# Patient Record
Sex: MALE | Race: WHITE | Employment: FULL TIME | URBAN - METROPOLITAN AREA
[De-identification: names, ages, dates, MRNs, and addresses within clinical notes are randomized per-mention and may not be internally consistent; named-entity substitution may affect disease eponyms.]

---

## 2017-11-07 PROBLEM — M16.11 PRIMARY OSTEOARTHRITIS OF RIGHT HIP: Status: ACTIVE | Noted: 2017-11-07

## 2018-04-12 ENCOUNTER — HOSPITAL ENCOUNTER (OUTPATIENT)
Dept: PREADMISSION TESTING | Age: 58
Discharge: HOME | End: 2018-04-16
Payer: COMMERCIAL

## 2018-04-12 RX ORDER — OMEPRAZOLE 20 MG/1
20 CAPSULE, DELAYED RELEASE ORAL DAILY
COMMUNITY

## 2018-04-12 RX ORDER — CETIRIZINE HYDROCHLORIDE 5 MG/1
5 TABLET ORAL PRN
COMMUNITY

## 2018-04-12 RX ORDER — LIDOCAINE HYDROCHLORIDE 10 MG/ML
1 INJECTION, SOLUTION EPIDURAL; INFILTRATION; INTRACAUDAL; PERINEURAL
Status: CANCELLED | OUTPATIENT
Start: 2018-04-12 | End: 2018-04-12

## 2018-04-12 RX ORDER — SODIUM CHLORIDE, SODIUM LACTATE, POTASSIUM CHLORIDE, CALCIUM CHLORIDE 600; 310; 30; 20 MG/100ML; MG/100ML; MG/100ML; MG/100ML
INJECTION, SOLUTION INTRAVENOUS CONTINUOUS
Status: CANCELLED | OUTPATIENT
Start: 2018-04-12

## 2018-04-12 RX ORDER — SODIUM CHLORIDE 0.9 % (FLUSH) 0.9 %
10 SYRINGE (ML) INJECTION PRN
Status: CANCELLED | OUTPATIENT
Start: 2018-04-12

## 2018-04-12 RX ORDER — SODIUM CHLORIDE 0.9 % (FLUSH) 0.9 %
10 SYRINGE (ML) INJECTION EVERY 12 HOURS SCHEDULED
Status: CANCELLED | OUTPATIENT
Start: 2018-04-12

## 2018-04-19 ENCOUNTER — ANESTHESIA EVENT (OUTPATIENT)
Dept: OPERATING ROOM | Age: 58
DRG: 470 | End: 2018-04-19
Payer: COMMERCIAL

## 2018-04-20 ENCOUNTER — ANESTHESIA (OUTPATIENT)
Dept: OPERATING ROOM | Age: 58
DRG: 470 | End: 2018-04-20
Payer: COMMERCIAL

## 2018-04-20 ENCOUNTER — HOSPITAL ENCOUNTER (INPATIENT)
Age: 58
LOS: 1 days | Discharge: HOME HEALTH | DRG: 470 | End: 2018-04-21
Attending: ORTHOPAEDIC SURGERY | Admitting: ORTHOPAEDIC SURGERY
Payer: COMMERCIAL

## 2018-04-20 ENCOUNTER — APPOINTMENT (OUTPATIENT)
Dept: GENERAL RADIOLOGY | Age: 58
DRG: 470 | End: 2018-04-20
Attending: ORTHOPAEDIC SURGERY
Payer: COMMERCIAL

## 2018-04-20 VITALS — TEMPERATURE: 96.3 F | DIASTOLIC BLOOD PRESSURE: 57 MMHG | SYSTOLIC BLOOD PRESSURE: 104 MMHG | OXYGEN SATURATION: 100 %

## 2018-04-20 DIAGNOSIS — M16.11 PRIMARY OSTEOARTHRITIS OF RIGHT HIP: ICD-10-CM

## 2018-04-20 DIAGNOSIS — Z96.641 STATUS POST TOTAL HIP REPLACEMENT, RIGHT: Primary | ICD-10-CM

## 2018-04-20 DIAGNOSIS — M47.817 LUMBOSACRAL SPONDYLOSIS WITHOUT MYELOPATHY: ICD-10-CM

## 2018-04-20 PROCEDURE — 97166 OT EVAL MOD COMPLEX 45 MIN: CPT

## 2018-04-20 PROCEDURE — 6370000000 HC RX 637 (ALT 250 FOR IP): Performed by: ORTHOPAEDIC SURGERY

## 2018-04-20 PROCEDURE — 7100000001 HC PACU RECOVERY - ADDTL 15 MIN: Performed by: ORTHOPAEDIC SURGERY

## 2018-04-20 PROCEDURE — 2500000003 HC RX 250 WO HCPCS: Performed by: NURSE ANESTHETIST, CERTIFIED REGISTERED

## 2018-04-20 PROCEDURE — 0SR90JA REPLACEMENT OF RIGHT HIP JOINT WITH SYNTHETIC SUBSTITUTE, UNCEMENTED, OPEN APPROACH: ICD-10-PCS | Performed by: ORTHOPAEDIC SURGERY

## 2018-04-20 PROCEDURE — G8979 MOBILITY GOAL STATUS: HCPCS

## 2018-04-20 PROCEDURE — 2720000010 HC SURG SUPPLY STERILE: Performed by: ORTHOPAEDIC SURGERY

## 2018-04-20 PROCEDURE — 97162 PT EVAL MOD COMPLEX 30 MIN: CPT

## 2018-04-20 PROCEDURE — G8978 MOBILITY CURRENT STATUS: HCPCS

## 2018-04-20 PROCEDURE — 6360000002 HC RX W HCPCS: Performed by: NURSE ANESTHETIST, CERTIFIED REGISTERED

## 2018-04-20 PROCEDURE — 6360000002 HC RX W HCPCS: Performed by: ORTHOPAEDIC SURGERY

## 2018-04-20 PROCEDURE — 2580000003 HC RX 258

## 2018-04-20 PROCEDURE — 97535 SELF CARE MNGMENT TRAINING: CPT

## 2018-04-20 PROCEDURE — 3600000004 HC SURGERY LEVEL 4 BASE: Performed by: ORTHOPAEDIC SURGERY

## 2018-04-20 PROCEDURE — 7100000000 HC PACU RECOVERY - FIRST 15 MIN: Performed by: ORTHOPAEDIC SURGERY

## 2018-04-20 PROCEDURE — 1210000000 HC MED SURG R&B

## 2018-04-20 PROCEDURE — C1776 JOINT DEVICE (IMPLANTABLE): HCPCS | Performed by: ORTHOPAEDIC SURGERY

## 2018-04-20 PROCEDURE — 3700000001 HC ADD 15 MINUTES (ANESTHESIA): Performed by: ORTHOPAEDIC SURGERY

## 2018-04-20 PROCEDURE — G8987 SELF CARE CURRENT STATUS: HCPCS

## 2018-04-20 PROCEDURE — 2580000003 HC RX 258: Performed by: ORTHOPAEDIC SURGERY

## 2018-04-20 PROCEDURE — G8988 SELF CARE GOAL STATUS: HCPCS

## 2018-04-20 PROCEDURE — 3600000014 HC SURGERY LEVEL 4 ADDTL 15MIN: Performed by: ORTHOPAEDIC SURGERY

## 2018-04-20 PROCEDURE — 2580000003 HC RX 258: Performed by: NURSE PRACTITIONER

## 2018-04-20 PROCEDURE — 6370000000 HC RX 637 (ALT 250 FOR IP): Performed by: NURSE PRACTITIONER

## 2018-04-20 PROCEDURE — 73501 X-RAY EXAM HIP UNI 1 VIEW: CPT

## 2018-04-20 PROCEDURE — 2500000003 HC RX 250 WO HCPCS: Performed by: ORTHOPAEDIC SURGERY

## 2018-04-20 PROCEDURE — 97116 GAIT TRAINING THERAPY: CPT

## 2018-04-20 PROCEDURE — 3700000000 HC ANESTHESIA ATTENDED CARE: Performed by: ORTHOPAEDIC SURGERY

## 2018-04-20 PROCEDURE — C1713 ANCHOR/SCREW BN/BN,TIS/BN: HCPCS | Performed by: ORTHOPAEDIC SURGERY

## 2018-04-20 PROCEDURE — 2500000003 HC RX 250 WO HCPCS

## 2018-04-20 DEVICE — BONE SCREW 6.5X40 SELF-TAP: Type: IMPLANTABLE DEVICE | Site: HIP | Status: FUNCTIONAL

## 2018-04-20 DEVICE — IMPLANTABLE DEVICE: Type: IMPLANTABLE DEVICE | Site: HIP | Status: FUNCTIONAL

## 2018-04-20 DEVICE — HEAD FEMORAL COCR 12/14 36MM +0: Type: IMPLANTABLE DEVICE | Site: HIP | Status: FUNCTIONAL

## 2018-04-20 DEVICE — KIT THR TRABECULAR MTL STEM CUP XLPE LNR: Type: IMPLANTABLE DEVICE | Site: HIP | Status: FUNCTIONAL

## 2018-04-20 RX ORDER — BUPIVACAINE HYDROCHLORIDE 5 MG/ML
INJECTION, SOLUTION EPIDURAL; INTRACAUDAL PRN
Status: DISCONTINUED | OUTPATIENT
Start: 2018-04-20 | End: 2018-04-20 | Stop reason: SDUPTHER

## 2018-04-20 RX ORDER — SODIUM CHLORIDE, SODIUM LACTATE, POTASSIUM CHLORIDE, CALCIUM CHLORIDE 600; 310; 30; 20 MG/100ML; MG/100ML; MG/100ML; MG/100ML
INJECTION, SOLUTION INTRAVENOUS
Status: COMPLETED
Start: 2018-04-20 | End: 2018-04-20

## 2018-04-20 RX ORDER — FENTANYL CITRATE 50 UG/ML
50 INJECTION, SOLUTION INTRAMUSCULAR; INTRAVENOUS EVERY 10 MIN PRN
Status: DISCONTINUED | OUTPATIENT
Start: 2018-04-20 | End: 2018-04-20 | Stop reason: HOSPADM

## 2018-04-20 RX ORDER — SODIUM CHLORIDE 0.9 % (FLUSH) 0.9 %
10 SYRINGE (ML) INJECTION PRN
Status: DISCONTINUED | OUTPATIENT
Start: 2018-04-20 | End: 2018-04-20 | Stop reason: HOSPADM

## 2018-04-20 RX ORDER — DIAZEPAM 5 MG/1
5 TABLET ORAL EVERY 6 HOURS PRN
Status: DISCONTINUED | OUTPATIENT
Start: 2018-04-20 | End: 2018-04-21 | Stop reason: HOSPADM

## 2018-04-20 RX ORDER — SODIUM CHLORIDE 0.9 % (FLUSH) 0.9 %
10 SYRINGE (ML) INJECTION EVERY 12 HOURS SCHEDULED
Status: DISCONTINUED | OUTPATIENT
Start: 2018-04-20 | End: 2018-04-21 | Stop reason: HOSPADM

## 2018-04-20 RX ORDER — SODIUM CHLORIDE, SODIUM LACTATE, POTASSIUM CHLORIDE, CALCIUM CHLORIDE 600; 310; 30; 20 MG/100ML; MG/100ML; MG/100ML; MG/100ML
INJECTION, SOLUTION INTRAVENOUS CONTINUOUS
Status: DISCONTINUED | OUTPATIENT
Start: 2018-04-20 | End: 2018-04-20

## 2018-04-20 RX ORDER — GLYCOPYRROLATE 1 MG/5 ML
SYRINGE (ML) INTRAVENOUS PRN
Status: DISCONTINUED | OUTPATIENT
Start: 2018-04-20 | End: 2018-04-20 | Stop reason: SDUPTHER

## 2018-04-20 RX ORDER — ASPIRIN 81 MG/1
81 TABLET ORAL 2 TIMES DAILY
Status: DISCONTINUED | OUTPATIENT
Start: 2018-04-21 | End: 2018-04-21 | Stop reason: HOSPADM

## 2018-04-20 RX ORDER — ACETAMINOPHEN 325 MG/1
650 TABLET ORAL EVERY 6 HOURS
Status: DISCONTINUED | OUTPATIENT
Start: 2018-04-20 | End: 2018-04-21 | Stop reason: HOSPADM

## 2018-04-20 RX ORDER — ASPIRIN 81 MG/1
81 TABLET ORAL 2 TIMES DAILY
Qty: 60 TABLET | Refills: 0 | Status: SHIPPED | OUTPATIENT
Start: 2018-04-21 | End: 2018-05-21

## 2018-04-20 RX ORDER — ACETAMINOPHEN 500 MG
1000 TABLET ORAL ONCE
Status: COMPLETED | OUTPATIENT
Start: 2018-04-20 | End: 2018-04-20

## 2018-04-20 RX ORDER — LIDOCAINE HYDROCHLORIDE 10 MG/ML
1 INJECTION, SOLUTION EPIDURAL; INFILTRATION; INTRACAUDAL; PERINEURAL
Status: COMPLETED | OUTPATIENT
Start: 2018-04-20 | End: 2018-04-20

## 2018-04-20 RX ORDER — PROPOFOL 10 MG/ML
INJECTION, EMULSION INTRAVENOUS CONTINUOUS PRN
Status: DISCONTINUED | OUTPATIENT
Start: 2018-04-20 | End: 2018-04-20 | Stop reason: SDUPTHER

## 2018-04-20 RX ORDER — CELECOXIB 200 MG/1
400 CAPSULE ORAL ONCE
Status: COMPLETED | OUTPATIENT
Start: 2018-04-20 | End: 2018-04-20

## 2018-04-20 RX ORDER — MORPHINE SULFATE 4 MG/ML
4 INJECTION, SOLUTION INTRAMUSCULAR; INTRAVENOUS
Status: DISCONTINUED | OUTPATIENT
Start: 2018-04-20 | End: 2018-04-21 | Stop reason: HOSPADM

## 2018-04-20 RX ORDER — SODIUM CHLORIDE 0.9 % (FLUSH) 0.9 %
10 SYRINGE (ML) INJECTION PRN
Status: DISCONTINUED | OUTPATIENT
Start: 2018-04-20 | End: 2018-04-21 | Stop reason: HOSPADM

## 2018-04-20 RX ORDER — LIDOCAINE HYDROCHLORIDE 10 MG/ML
INJECTION, SOLUTION EPIDURAL; INFILTRATION; INTRACAUDAL; PERINEURAL
Status: COMPLETED
Start: 2018-04-20 | End: 2018-04-20

## 2018-04-20 RX ORDER — HYDROCODONE BITARTRATE AND ACETAMINOPHEN 5; 325 MG/1; MG/1
1 TABLET ORAL PRN
Status: DISCONTINUED | OUTPATIENT
Start: 2018-04-20 | End: 2018-04-20 | Stop reason: HOSPADM

## 2018-04-20 RX ORDER — OXYCODONE HCL 10 MG/1
10 TABLET, FILM COATED, EXTENDED RELEASE ORAL ONCE
Status: COMPLETED | OUTPATIENT
Start: 2018-04-20 | End: 2018-04-20

## 2018-04-20 RX ORDER — MIDAZOLAM HYDROCHLORIDE 1 MG/ML
INJECTION INTRAMUSCULAR; INTRAVENOUS PRN
Status: DISCONTINUED | OUTPATIENT
Start: 2018-04-20 | End: 2018-04-20 | Stop reason: SDUPTHER

## 2018-04-20 RX ORDER — TRAMADOL HYDROCHLORIDE 50 MG/1
50 TABLET ORAL EVERY 6 HOURS PRN
Qty: 28 TABLET | Refills: 0 | Status: SHIPPED | OUTPATIENT
Start: 2018-04-20 | End: 2018-04-27

## 2018-04-20 RX ORDER — ONDANSETRON 2 MG/ML
4 INJECTION INTRAMUSCULAR; INTRAVENOUS
Status: DISCONTINUED | OUTPATIENT
Start: 2018-04-20 | End: 2018-04-20 | Stop reason: HOSPADM

## 2018-04-20 RX ORDER — METOCLOPRAMIDE HYDROCHLORIDE 5 MG/ML
10 INJECTION INTRAMUSCULAR; INTRAVENOUS
Status: DISCONTINUED | OUTPATIENT
Start: 2018-04-20 | End: 2018-04-20 | Stop reason: HOSPADM

## 2018-04-20 RX ORDER — KETOROLAC TROMETHAMINE 30 MG/ML
30 INJECTION, SOLUTION INTRAMUSCULAR; INTRAVENOUS EVERY 6 HOURS
Status: COMPLETED | OUTPATIENT
Start: 2018-04-20 | End: 2018-04-20

## 2018-04-20 RX ORDER — DIAZEPAM 5 MG/1
5 TABLET ORAL EVERY 6 HOURS PRN
Qty: 20 TABLET | Refills: 0 | Status: SHIPPED | OUTPATIENT
Start: 2018-04-20 | End: 2018-04-27

## 2018-04-20 RX ORDER — ONDANSETRON 2 MG/ML
4 INJECTION INTRAMUSCULAR; INTRAVENOUS EVERY 6 HOURS PRN
Status: DISCONTINUED | OUTPATIENT
Start: 2018-04-20 | End: 2018-04-21 | Stop reason: HOSPADM

## 2018-04-20 RX ORDER — SENNA AND DOCUSATE SODIUM 50; 8.6 MG/1; MG/1
1 TABLET, FILM COATED ORAL DAILY
Status: DISCONTINUED | OUTPATIENT
Start: 2018-04-20 | End: 2018-04-21 | Stop reason: HOSPADM

## 2018-04-20 RX ORDER — DIPHENHYDRAMINE HYDROCHLORIDE 50 MG/ML
12.5 INJECTION INTRAMUSCULAR; INTRAVENOUS
Status: DISCONTINUED | OUTPATIENT
Start: 2018-04-20 | End: 2018-04-20 | Stop reason: HOSPADM

## 2018-04-20 RX ORDER — SODIUM CHLORIDE 9 MG/ML
INJECTION, SOLUTION INTRAVENOUS CONTINUOUS
Status: DISCONTINUED | OUTPATIENT
Start: 2018-04-20 | End: 2018-04-21 | Stop reason: HOSPADM

## 2018-04-20 RX ORDER — PROPOFOL 10 MG/ML
INJECTION, EMULSION INTRAVENOUS PRN
Status: DISCONTINUED | OUTPATIENT
Start: 2018-04-20 | End: 2018-04-20 | Stop reason: SDUPTHER

## 2018-04-20 RX ORDER — MORPHINE SULFATE 2 MG/ML
2 INJECTION, SOLUTION INTRAMUSCULAR; INTRAVENOUS
Status: DISCONTINUED | OUTPATIENT
Start: 2018-04-20 | End: 2018-04-21 | Stop reason: HOSPADM

## 2018-04-20 RX ORDER — HYDROCODONE BITARTRATE AND ACETAMINOPHEN 5; 325 MG/1; MG/1
2 TABLET ORAL PRN
Status: DISCONTINUED | OUTPATIENT
Start: 2018-04-20 | End: 2018-04-20 | Stop reason: HOSPADM

## 2018-04-20 RX ORDER — OXYCODONE HYDROCHLORIDE AND ACETAMINOPHEN 5; 325 MG/1; MG/1
1 TABLET ORAL EVERY 4 HOURS PRN
Status: DISCONTINUED | OUTPATIENT
Start: 2018-04-20 | End: 2018-04-21 | Stop reason: HOSPADM

## 2018-04-20 RX ORDER — OXYCODONE HYDROCHLORIDE AND ACETAMINOPHEN 5; 325 MG/1; MG/1
1 TABLET ORAL EVERY 4 HOURS PRN
Qty: 40 TABLET | Refills: 0 | Status: SHIPPED | OUTPATIENT
Start: 2018-04-20 | End: 2018-04-27

## 2018-04-20 RX ORDER — PANTOPRAZOLE SODIUM 40 MG/1
40 TABLET, DELAYED RELEASE ORAL
Status: DISCONTINUED | OUTPATIENT
Start: 2018-04-21 | End: 2018-04-21 | Stop reason: HOSPADM

## 2018-04-20 RX ORDER — DOCUSATE SODIUM 100 MG/1
100 CAPSULE, LIQUID FILLED ORAL 2 TIMES DAILY
Status: DISCONTINUED | OUTPATIENT
Start: 2018-04-20 | End: 2018-04-21 | Stop reason: HOSPADM

## 2018-04-20 RX ORDER — MAGNESIUM HYDROXIDE 1200 MG/15ML
LIQUID ORAL CONTINUOUS PRN
Status: DISCONTINUED | OUTPATIENT
Start: 2018-04-20 | End: 2018-04-20 | Stop reason: HOSPADM

## 2018-04-20 RX ORDER — TRAMADOL HYDROCHLORIDE 50 MG/1
50 TABLET ORAL EVERY 6 HOURS PRN
Status: DISCONTINUED | OUTPATIENT
Start: 2018-04-20 | End: 2018-04-21 | Stop reason: HOSPADM

## 2018-04-20 RX ORDER — ACETAMINOPHEN 325 MG/1
650 TABLET ORAL EVERY 6 HOURS
Qty: 120 TABLET | Refills: 0 | Status: SHIPPED | OUTPATIENT
Start: 2018-04-20

## 2018-04-20 RX ORDER — SODIUM CHLORIDE 0.9 % (FLUSH) 0.9 %
10 SYRINGE (ML) INJECTION EVERY 12 HOURS SCHEDULED
Status: DISCONTINUED | OUTPATIENT
Start: 2018-04-20 | End: 2018-04-20 | Stop reason: HOSPADM

## 2018-04-20 RX ORDER — MEPERIDINE HYDROCHLORIDE 25 MG/ML
12.5 INJECTION INTRAMUSCULAR; INTRAVENOUS; SUBCUTANEOUS EVERY 5 MIN PRN
Status: DISCONTINUED | OUTPATIENT
Start: 2018-04-20 | End: 2018-04-20 | Stop reason: HOSPADM

## 2018-04-20 RX ORDER — DEXAMETHASONE SODIUM PHOSPHATE 4 MG/ML
INJECTION, SOLUTION INTRA-ARTICULAR; INTRALESIONAL; INTRAMUSCULAR; INTRAVENOUS; SOFT TISSUE PRN
Status: DISCONTINUED | OUTPATIENT
Start: 2018-04-20 | End: 2018-04-20 | Stop reason: SDUPTHER

## 2018-04-20 RX ADMIN — Medication 0.2 MG: at 08:07

## 2018-04-20 RX ADMIN — PHENYLEPHRINE HYDROCHLORIDE 100 MCG: 10 INJECTION INTRAVENOUS at 08:27

## 2018-04-20 RX ADMIN — PHENYLEPHRINE HYDROCHLORIDE 100 MCG: 10 INJECTION INTRAVENOUS at 08:36

## 2018-04-20 RX ADMIN — TRAMADOL HYDROCHLORIDE 50 MG: 50 TABLET, FILM COATED ORAL at 17:47

## 2018-04-20 RX ADMIN — OXYCODONE HYDROCHLORIDE AND ACETAMINOPHEN 1 TABLET: 5; 325 TABLET ORAL at 14:38

## 2018-04-20 RX ADMIN — ACETAMINOPHEN 650 MG: 325 TABLET ORAL at 13:10

## 2018-04-20 RX ADMIN — PROPOFOL 20 MG: 10 INJECTION, EMULSION INTRAVENOUS at 07:57

## 2018-04-20 RX ADMIN — BUPIVACAINE HYDROCHLORIDE 2 ML: 5 INJECTION, SOLUTION EPIDURAL; INTRACAUDAL; PERINEURAL at 07:49

## 2018-04-20 RX ADMIN — OXYCODONE HYDROCHLORIDE 10 MG: 10 TABLET, FILM COATED, EXTENDED RELEASE ORAL at 06:28

## 2018-04-20 RX ADMIN — PROPOFOL 140 MCG/KG/MIN: 10 INJECTION, EMULSION INTRAVENOUS at 07:58

## 2018-04-20 RX ADMIN — SODIUM CHLORIDE, POTASSIUM CHLORIDE, SODIUM LACTATE AND CALCIUM CHLORIDE 1000 ML: 600; 310; 30; 20 INJECTION, SOLUTION INTRAVENOUS at 06:46

## 2018-04-20 RX ADMIN — Medication 0.2 MG: at 08:36

## 2018-04-20 RX ADMIN — CEFAZOLIN SODIUM 2 G: 2 SOLUTION INTRAVENOUS at 16:38

## 2018-04-20 RX ADMIN — CELECOXIB 400 MG: 200 CAPSULE ORAL at 06:31

## 2018-04-20 RX ADMIN — PHENYLEPHRINE HYDROCHLORIDE 50 MCG: 10 INJECTION INTRAVENOUS at 08:20

## 2018-04-20 RX ADMIN — CEFAZOLIN SODIUM 2 G: 2 SOLUTION INTRAVENOUS at 07:41

## 2018-04-20 RX ADMIN — SODIUM CHLORIDE: 9 INJECTION, SOLUTION INTRAVENOUS at 13:11

## 2018-04-20 RX ADMIN — PHENYLEPHRINE HYDROCHLORIDE 50 MCG: 10 INJECTION INTRAVENOUS at 08:16

## 2018-04-20 RX ADMIN — DOCUSATE SODIUM 100 MG: 100 CAPSULE, LIQUID FILLED ORAL at 13:10

## 2018-04-20 RX ADMIN — PHENYLEPHRINE HYDROCHLORIDE 100 MCG: 10 INJECTION INTRAVENOUS at 08:31

## 2018-04-20 RX ADMIN — LIDOCAINE HYDROCHLORIDE 1 ML: 10 INJECTION, SOLUTION EPIDURAL; INFILTRATION; INTRACAUDAL; PERINEURAL at 06:46

## 2018-04-20 RX ADMIN — MIDAZOLAM HYDROCHLORIDE 2 MG: 1 INJECTION, SOLUTION INTRAMUSCULAR; INTRAVENOUS at 07:41

## 2018-04-20 RX ADMIN — Medication 0.2 MG: at 08:17

## 2018-04-20 RX ADMIN — KETOROLAC TROMETHAMINE 30 MG: 30 INJECTION, SOLUTION INTRAMUSCULAR at 13:10

## 2018-04-20 RX ADMIN — PHENYLEPHRINE HYDROCHLORIDE 50 MCG: 10 INJECTION INTRAVENOUS at 08:13

## 2018-04-20 RX ADMIN — DEXAMETHASONE SODIUM PHOSPHATE 4 MG: 4 INJECTION INTRA-ARTICULAR; INTRALESIONAL; INTRAMUSCULAR; INTRAVENOUS; SOFT TISSUE at 08:08

## 2018-04-20 RX ADMIN — ACETAMINOPHEN 1000 MG: 500 TABLET, FILM COATED ORAL at 06:27

## 2018-04-20 RX ADMIN — PHENYLEPHRINE HYDROCHLORIDE 100 MCG: 10 INJECTION INTRAVENOUS at 08:40

## 2018-04-20 RX ADMIN — PROPOFOL 10 MG: 10 INJECTION, EMULSION INTRAVENOUS at 07:56

## 2018-04-20 RX ADMIN — PROPOFOL 20 MG: 10 INJECTION, EMULSION INTRAVENOUS at 07:59

## 2018-04-20 RX ADMIN — OXYCODONE HYDROCHLORIDE AND ACETAMINOPHEN 1 TABLET: 5; 325 TABLET ORAL at 19:53

## 2018-04-20 RX ADMIN — SODIUM CHLORIDE, POTASSIUM CHLORIDE, SODIUM LACTATE AND CALCIUM CHLORIDE: 600; 310; 30; 20 INJECTION, SOLUTION INTRAVENOUS at 08:21

## 2018-04-20 RX ADMIN — KETOROLAC TROMETHAMINE 30 MG: 30 INJECTION, SOLUTION INTRAMUSCULAR at 18:55

## 2018-04-20 RX ADMIN — PHENYLEPHRINE HYDROCHLORIDE 100 MCG: 10 INJECTION INTRAVENOUS at 08:52

## 2018-04-20 RX ADMIN — PHENYLEPHRINE HYDROCHLORIDE 50 MCG: 10 INJECTION INTRAVENOUS at 08:24

## 2018-04-20 RX ADMIN — ACETAMINOPHEN 650 MG: 325 TABLET ORAL at 18:55

## 2018-04-20 ASSESSMENT — PULMONARY FUNCTION TESTS
PIF_VALUE: 0
PIF_VALUE: 0
PIF_VALUE: 1
PIF_VALUE: 0
PIF_VALUE: 1
PIF_VALUE: 0
PIF_VALUE: 1
PIF_VALUE: 0

## 2018-04-20 ASSESSMENT — PAIN SCALES - GENERAL
PAINLEVEL_OUTOF10: 4
PAINLEVEL_OUTOF10: 0
PAINLEVEL_OUTOF10: 5
PAINLEVEL_OUTOF10: 0
PAINLEVEL_OUTOF10: 6
PAINLEVEL_OUTOF10: 6
PAINLEVEL_OUTOF10: 5
PAINLEVEL_OUTOF10: 6

## 2018-04-20 ASSESSMENT — PAIN DESCRIPTION - ORIENTATION: ORIENTATION: RIGHT

## 2018-04-20 ASSESSMENT — PAIN DESCRIPTION - DESCRIPTORS: DESCRIPTORS: ACHING;CONSTANT

## 2018-04-20 ASSESSMENT — PAIN - FUNCTIONAL ASSESSMENT: PAIN_FUNCTIONAL_ASSESSMENT: 0-10

## 2018-04-20 ASSESSMENT — PAIN DESCRIPTION - LOCATION: LOCATION: HIP

## 2018-04-20 ASSESSMENT — PAIN DESCRIPTION - FREQUENCY: FREQUENCY: CONTINUOUS

## 2018-04-20 ASSESSMENT — PAIN DESCRIPTION - PAIN TYPE: TYPE: ACUTE PAIN

## 2018-04-21 VITALS
DIASTOLIC BLOOD PRESSURE: 69 MMHG | RESPIRATION RATE: 18 BRPM | SYSTOLIC BLOOD PRESSURE: 123 MMHG | HEIGHT: 72 IN | BODY MASS INDEX: 29.8 KG/M2 | WEIGHT: 220 LBS | HEART RATE: 74 BPM | OXYGEN SATURATION: 98 % | TEMPERATURE: 97.5 F

## 2018-04-21 LAB
ANION GAP SERPL CALCULATED.3IONS-SCNC: 12 MEQ/L (ref 7–13)
BUN BLDV-MCNC: 16 MG/DL (ref 6–20)
CALCIUM SERPL-MCNC: 8.5 MG/DL (ref 8.6–10.2)
CHLORIDE BLD-SCNC: 100 MEQ/L (ref 98–107)
CO2: 29 MEQ/L (ref 22–29)
CREAT SERPL-MCNC: 0.57 MG/DL (ref 0.7–1.2)
GFR AFRICAN AMERICAN: >60
GFR NON-AFRICAN AMERICAN: >60
GLUCOSE BLD-MCNC: 127 MG/DL (ref 74–109)
HCT VFR BLD CALC: 35.6 % (ref 42–52)
HEMOGLOBIN: 12.3 G/DL (ref 14–18)
MCH RBC QN AUTO: 32.3 PG (ref 27–31.3)
MCHC RBC AUTO-ENTMCNC: 34.6 % (ref 33–37)
MCV RBC AUTO: 93.5 FL (ref 80–100)
PDW BLD-RTO: 13.7 % (ref 11.5–14.5)
PLATELET # BLD: 142 K/UL (ref 130–400)
POTASSIUM REFLEX MAGNESIUM: 4.4 MEQ/L (ref 3.5–5.1)
RBC # BLD: 3.81 M/UL (ref 4.7–6.1)
SODIUM BLD-SCNC: 141 MEQ/L (ref 132–144)
WBC # BLD: 9.3 K/UL (ref 4.8–10.8)

## 2018-04-21 PROCEDURE — 6370000000 HC RX 637 (ALT 250 FOR IP): Performed by: NURSE PRACTITIONER

## 2018-04-21 PROCEDURE — 6370000000 HC RX 637 (ALT 250 FOR IP): Performed by: ORTHOPAEDIC SURGERY

## 2018-04-21 PROCEDURE — 36415 COLL VENOUS BLD VENIPUNCTURE: CPT

## 2018-04-21 PROCEDURE — 97116 GAIT TRAINING THERAPY: CPT

## 2018-04-21 PROCEDURE — 85027 COMPLETE CBC AUTOMATED: CPT

## 2018-04-21 PROCEDURE — 97535 SELF CARE MNGMENT TRAINING: CPT

## 2018-04-21 PROCEDURE — 97110 THERAPEUTIC EXERCISES: CPT

## 2018-04-21 PROCEDURE — 6360000002 HC RX W HCPCS: Performed by: ORTHOPAEDIC SURGERY

## 2018-04-21 PROCEDURE — 80048 BASIC METABOLIC PNL TOTAL CA: CPT

## 2018-04-21 RX ADMIN — OXYCODONE HYDROCHLORIDE AND ACETAMINOPHEN 1 TABLET: 5; 325 TABLET ORAL at 05:19

## 2018-04-21 RX ADMIN — SENNOSIDES AND DOCUSATE SODIUM 1 TABLET: 8.6; 5 TABLET ORAL at 08:20

## 2018-04-21 RX ADMIN — TRAMADOL HYDROCHLORIDE 50 MG: 50 TABLET, FILM COATED ORAL at 13:58

## 2018-04-21 RX ADMIN — DOCUSATE SODIUM 100 MG: 100 CAPSULE, LIQUID FILLED ORAL at 00:29

## 2018-04-21 RX ADMIN — OXYCODONE HYDROCHLORIDE AND ACETAMINOPHEN 1 TABLET: 5; 325 TABLET ORAL at 11:32

## 2018-04-21 RX ADMIN — ASPIRIN 81 MG: 81 TABLET, COATED ORAL at 08:20

## 2018-04-21 RX ADMIN — PANTOPRAZOLE SODIUM 40 MG: 40 TABLET, DELAYED RELEASE ORAL at 05:20

## 2018-04-21 RX ADMIN — DOCUSATE SODIUM 100 MG: 100 CAPSULE, LIQUID FILLED ORAL at 08:20

## 2018-04-21 RX ADMIN — SENNOSIDES AND DOCUSATE SODIUM 1 TABLET: 8.6; 5 TABLET ORAL at 00:30

## 2018-04-21 RX ADMIN — OXYCODONE HYDROCHLORIDE AND ACETAMINOPHEN 1 TABLET: 5; 325 TABLET ORAL at 15:40

## 2018-04-21 RX ADMIN — ACETAMINOPHEN 650 MG: 325 TABLET ORAL at 00:30

## 2018-04-21 RX ADMIN — CEFAZOLIN SODIUM 2 G: 2 SOLUTION INTRAVENOUS at 00:29

## 2018-04-21 RX ADMIN — TRAMADOL HYDROCHLORIDE 50 MG: 50 TABLET, FILM COATED ORAL at 00:29

## 2018-04-21 ASSESSMENT — PAIN DESCRIPTION - ORIENTATION
ORIENTATION: RIGHT
ORIENTATION: RIGHT

## 2018-04-21 ASSESSMENT — PAIN DESCRIPTION - LOCATION
LOCATION: HIP
LOCATION: HIP

## 2018-04-21 ASSESSMENT — ENCOUNTER SYMPTOMS
VOMITING: 0
NAUSEA: 0
SHORTNESS OF BREATH: 1

## 2018-04-21 ASSESSMENT — PAIN SCALES - GENERAL
PAINLEVEL_OUTOF10: 6
PAINLEVEL_OUTOF10: 4
PAINLEVEL_OUTOF10: 5
PAINLEVEL_OUTOF10: 2
PAINLEVEL_OUTOF10: 5
PAINLEVEL_OUTOF10: 4

## 2018-04-21 ASSESSMENT — PAIN DESCRIPTION - DESCRIPTORS
DESCRIPTORS: ACHING
DESCRIPTORS: ACHING

## 2018-04-21 ASSESSMENT — PAIN DESCRIPTION - PAIN TYPE
TYPE: SURGICAL PAIN;ACUTE PAIN
TYPE: SURGICAL PAIN;ACUTE PAIN

## 2023-11-07 PROBLEM — K21.9 GERD (GASTROESOPHAGEAL REFLUX DISEASE): Status: ACTIVE | Noted: 2023-11-07

## 2023-11-07 PROBLEM — J18.9 PNEUMONIA, COMMUNITY ACQUIRED: Status: ACTIVE | Noted: 2023-11-07

## 2023-11-07 PROBLEM — K22.70 BARRETT ESOPHAGUS: Status: ACTIVE | Noted: 2023-11-07

## 2023-11-07 PROBLEM — Z96.659 STATUS POST TOTAL KNEE REPLACEMENT: Status: ACTIVE | Noted: 2023-11-07

## 2023-11-07 PROBLEM — M79.89 SWELLING OF EXTREMITY: Status: ACTIVE | Noted: 2023-11-07

## 2023-11-07 PROBLEM — M54.12 CERVICAL RADICULAR PAIN: Status: ACTIVE | Noted: 2023-11-07

## 2023-11-07 PROBLEM — G56.00 CARPAL TUNNEL SYNDROME: Status: ACTIVE | Noted: 2023-11-07

## 2023-11-07 PROBLEM — M62.81 MUSCLE WEAKNESS: Status: ACTIVE | Noted: 2023-11-07

## 2023-11-07 PROBLEM — M17.12 LEFT KNEE DJD: Status: ACTIVE | Noted: 2023-11-07

## 2023-11-07 PROBLEM — H65.22 LEFT CHRONIC SEROUS OTITIS MEDIA: Status: ACTIVE | Noted: 2023-11-07

## 2023-11-07 PROBLEM — C85.90 NON-HODGKIN'S LYMPHOMA (MULTI): Status: ACTIVE | Noted: 2023-11-07

## 2023-11-07 PROBLEM — M62.89 MUSCLE TIGHTNESS: Status: ACTIVE | Noted: 2023-11-07

## 2023-11-07 PROBLEM — J44.9 COPD (CHRONIC OBSTRUCTIVE PULMONARY DISEASE) (MULTI): Status: ACTIVE | Noted: 2023-11-07

## 2023-11-07 PROBLEM — M54.2 NECK PAIN: Status: ACTIVE | Noted: 2023-11-07

## 2023-11-07 PROBLEM — H92.12 OTORRHEA OF LEFT EAR: Status: ACTIVE | Noted: 2023-11-07

## 2023-11-07 PROBLEM — R26.2 DIFFICULTY WALKING: Status: ACTIVE | Noted: 2023-11-07

## 2023-11-07 PROBLEM — J90 LOCULATED PLEURAL EFFUSION: Status: ACTIVE | Noted: 2023-11-07

## 2023-11-07 PROBLEM — R07.9 CHEST PAIN: Status: ACTIVE | Noted: 2023-11-07

## 2023-11-07 PROBLEM — M25.562 LEFT KNEE PAIN: Status: ACTIVE | Noted: 2023-11-07

## 2023-11-07 RX ORDER — CIPROFLOXACIN AND DEXAMETHASONE 3; 1 MG/ML; MG/ML
4 SUSPENSION/ DROPS AURICULAR (OTIC) 2 TIMES DAILY
COMMUNITY
Start: 2020-12-02

## 2023-11-07 RX ORDER — TRAMADOL HYDROCHLORIDE 50 MG/1
50 TABLET ORAL EVERY 8 HOURS
COMMUNITY
Start: 2022-06-06

## 2023-11-07 RX ORDER — CELECOXIB 200 MG/1
CAPSULE ORAL DAILY PRN
COMMUNITY
Start: 2022-06-29

## 2023-11-07 RX ORDER — DOCUSATE SODIUM 100 MG/1
100 CAPSULE, LIQUID FILLED ORAL 2 TIMES DAILY
COMMUNITY
Start: 2022-05-11

## 2023-11-07 RX ORDER — ALLOPURINOL 300 MG/1
300 TABLET ORAL DAILY
COMMUNITY
Start: 2017-06-09

## 2023-11-07 RX ORDER — OMEPRAZOLE 10 MG/1
CAPSULE, DELAYED RELEASE ORAL
COMMUNITY

## 2023-11-07 RX ORDER — LORATADINE 10 MG/1
CAPSULE, LIQUID FILLED ORAL
COMMUNITY

## 2023-11-07 RX ORDER — ASPIRIN 81 MG/1
1 TABLET ORAL 2 TIMES DAILY
COMMUNITY
Start: 2018-04-16

## 2023-11-07 RX ORDER — CYCLOBENZAPRINE HCL 10 MG
1 TABLET ORAL NIGHTLY
COMMUNITY
Start: 2022-05-23

## 2023-11-07 RX ORDER — TIOTROPIUM BROMIDE INHALATION SPRAY 3.12 UG/1
2 SPRAY, METERED RESPIRATORY (INHALATION) DAILY
COMMUNITY
Start: 2020-05-04

## 2023-11-07 RX ORDER — OFLOXACIN 3 MG/ML
4-5 SOLUTION AURICULAR (OTIC) 2 TIMES DAILY
COMMUNITY
Start: 2022-10-07

## 2023-11-07 RX ORDER — OXYCODONE AND ACETAMINOPHEN 5; 325 MG/1; MG/1
1 TABLET ORAL EVERY 8 HOURS
COMMUNITY
Start: 2022-05-16

## 2023-11-07 RX ORDER — ALBUTEROL SULFATE 90 UG/1
1-2 AEROSOL, METERED RESPIRATORY (INHALATION) EVERY 4 HOURS PRN
COMMUNITY
Start: 2019-08-26

## 2023-11-07 RX ORDER — FLUTICASONE PROPIONATE 50 MCG
2 SPRAY, SUSPENSION (ML) NASAL DAILY
COMMUNITY
Start: 2019-01-03

## 2023-11-07 RX ORDER — POLYETHYLENE GLYCOL 3350 17 G/17G
17 POWDER, FOR SOLUTION ORAL 2 TIMES DAILY
COMMUNITY
Start: 2022-05-11

## 2023-11-07 RX ORDER — OXYCODONE AND ACETAMINOPHEN 5; 325 MG/1; MG/1
1 TABLET ORAL EVERY 6 HOURS PRN
COMMUNITY

## 2023-11-08 ENCOUNTER — OFFICE VISIT (OUTPATIENT)
Dept: OTOLARYNGOLOGY | Facility: CLINIC | Age: 63
End: 2023-11-08
Payer: COMMERCIAL

## 2023-11-08 VITALS — BODY MASS INDEX: 29.8 KG/M2 | WEIGHT: 220 LBS | HEIGHT: 72 IN

## 2023-11-08 DIAGNOSIS — H65.22 LEFT CHRONIC SEROUS OTITIS MEDIA: Primary | ICD-10-CM

## 2023-11-08 PROCEDURE — 1036F TOBACCO NON-USER: CPT | Performed by: OTOLARYNGOLOGY

## 2023-11-08 PROCEDURE — 1159F MED LIST DOCD IN RCRD: CPT | Performed by: OTOLARYNGOLOGY

## 2023-11-08 PROCEDURE — 99212 OFFICE O/P EST SF 10 MIN: CPT | Performed by: OTOLARYNGOLOGY

## 2023-11-08 RX ORDER — PREDNISONE 20 MG/1
TABLET ORAL
Qty: 9 TABLET | Refills: 0 | Status: SHIPPED | OUTPATIENT
Start: 2023-11-08

## 2023-11-08 NOTE — PROGRESS NOTES
Yogesh Vázquez is a 63 y.o. year old male patient with Ear Fullness     Patient presents to the office today for assessment of ears.  Patient with known history of otitis media.  Complaining of left ear fullness today.  All other ENT issues are negative.  There have been no significant changes in past medical or past surgical histories except as mentioned.          Physical Exam:   General appearance: No acute distress. Normal facies. Symmetric facial movement. No gross lesions of the face are noted.  The external ear structures appear normal.  Examination of the right ear is unremarkable. There is no evidence of middle ear effusion or abnormality of the external auditory canal, tympanic membrane, and external ear itself.  Left ear with evidence of serous otitis media.  Anterior rhinoscopy notes essentially a midline nasal septum. Examination is noted for normal healthy mucosal membranes without any evidence of lesions, polyps, or exudate. The tongue is normally mobile. There are no lesions on the gingiva, buccal, or oral mucosa. There are no oral cavity masses.  The neck is negative for mass lymphadenopathy. The trachea and parotid are clear. The thyroid bed is grossly unremarkable. The salivary gland structures are grossly unremarkable.    Assessment/Plan   1.  Serous otitis media left ear  \Patient seen in the office for assessment of ears with left serous otitis.  Patient to be started on prednisone taper.  We will see the patient back Monday before Thanksgiving if symptoms or not improved.  He is going to call next Friday with an update.

## 2023-11-20 ENCOUNTER — OFFICE VISIT (OUTPATIENT)
Dept: OTOLARYNGOLOGY | Facility: CLINIC | Age: 63
End: 2023-11-20
Payer: COMMERCIAL

## 2023-11-20 DIAGNOSIS — H65.22 LEFT CHRONIC SEROUS OTITIS MEDIA: Primary | ICD-10-CM

## 2023-11-20 PROCEDURE — 1159F MED LIST DOCD IN RCRD: CPT | Performed by: OTOLARYNGOLOGY

## 2023-11-20 PROCEDURE — 1160F RVW MEDS BY RX/DR IN RCRD: CPT | Performed by: OTOLARYNGOLOGY

## 2023-11-20 PROCEDURE — 1036F TOBACCO NON-USER: CPT | Performed by: OTOLARYNGOLOGY

## 2023-11-20 PROCEDURE — 69433 CREATE EARDRUM OPENING: CPT | Performed by: OTOLARYNGOLOGY

## 2023-11-20 NOTE — PROGRESS NOTES
The patient returns.  We are seeing him back today.  Unfortunately his left ear has not gotten any better with the effusion.  Still very blocked and clogged.  Remaining ENT inquiry clear.    Exam confirms chronic middle ear effusion yue in color left ear only.    Procedure:  After informed consent verbal, patient had topical phenol anesthesia applied to the posterior inferior aspect of the left tympanic membrane with myringotomy and suctioning free of extensive effusion followed by placement of a grommet type tube.  Patient tolerated well.    Assessment and plan:  Left chronic serous otitis media status post ear tube placement today in the office.  Recheck 6 months, sooner with issue.  All questions were answered in this regard accordingly.

## 2024-02-06 ENCOUNTER — LAB (OUTPATIENT)
Dept: LAB | Facility: CLINIC | Age: 64
End: 2024-02-06
Payer: COMMERCIAL

## 2024-02-06 ENCOUNTER — OFFICE VISIT (OUTPATIENT)
Dept: HEMATOLOGY/ONCOLOGY | Facility: CLINIC | Age: 64
End: 2024-02-06
Payer: COMMERCIAL

## 2024-02-06 VITALS
HEART RATE: 82 BPM | WEIGHT: 231.7 LBS | DIASTOLIC BLOOD PRESSURE: 77 MMHG | RESPIRATION RATE: 16 BRPM | TEMPERATURE: 98.2 F | OXYGEN SATURATION: 94 % | BODY MASS INDEX: 31.42 KG/M2 | SYSTOLIC BLOOD PRESSURE: 126 MMHG

## 2024-02-06 DIAGNOSIS — M17.12 OSTEOARTHRITIS OF LEFT KNEE, UNSPECIFIED OSTEOARTHRITIS TYPE: ICD-10-CM

## 2024-02-06 DIAGNOSIS — J86.9 EMPYEMA (MULTI): ICD-10-CM

## 2024-02-06 DIAGNOSIS — M1A.9XX0 CHRONIC GOUT WITHOUT TOPHUS, UNSPECIFIED CAUSE, UNSPECIFIED SITE: ICD-10-CM

## 2024-02-06 DIAGNOSIS — C83.38 DIFFUSE LARGE B-CELL LYMPHOMA OF LYMPH NODES OF MULTIPLE REGIONS (MULTI): ICD-10-CM

## 2024-02-06 DIAGNOSIS — J44.9 CHRONIC OBSTRUCTIVE PULMONARY DISEASE, UNSPECIFIED COPD TYPE (MULTI): ICD-10-CM

## 2024-02-06 DIAGNOSIS — C83.38 DIFFUSE LARGE B-CELL LYMPHOMA OF LYMPH NODES OF MULTIPLE REGIONS (MULTI): Primary | ICD-10-CM

## 2024-02-06 DIAGNOSIS — K22.70 BARRETT'S ESOPHAGUS WITHOUT DYSPLASIA: ICD-10-CM

## 2024-02-06 LAB
ALBUMIN SERPL BCP-MCNC: 4.6 G/DL (ref 3.4–5)
ALP SERPL-CCNC: 61 U/L (ref 33–136)
ALT SERPL W P-5'-P-CCNC: 20 U/L (ref 10–52)
ANION GAP SERPL CALC-SCNC: 13 MMOL/L (ref 10–20)
AST SERPL W P-5'-P-CCNC: 11 U/L (ref 9–39)
BASOPHILS # BLD AUTO: 0.04 X10*3/UL (ref 0–0.1)
BASOPHILS NFR BLD AUTO: 0.4 %
BILIRUB SERPL-MCNC: 0.4 MG/DL (ref 0–1.2)
BUN SERPL-MCNC: 19 MG/DL (ref 6–23)
CALCIUM SERPL-MCNC: 9.1 MG/DL (ref 8.6–10.3)
CHLORIDE SERPL-SCNC: 102 MMOL/L (ref 98–107)
CO2 SERPL-SCNC: 29 MMOL/L (ref 21–32)
CREAT SERPL-MCNC: 0.91 MG/DL (ref 0.5–1.3)
EGFRCR SERPLBLD CKD-EPI 2021: >90 ML/MIN/1.73M*2
EOSINOPHIL # BLD AUTO: 0.39 X10*3/UL (ref 0–0.7)
EOSINOPHIL NFR BLD AUTO: 3.8 %
ERYTHROCYTE [DISTWIDTH] IN BLOOD BY AUTOMATED COUNT: 13 % (ref 11.5–14.5)
GLUCOSE SERPL-MCNC: 99 MG/DL (ref 74–99)
HCT VFR BLD AUTO: 46.1 % (ref 41–52)
HGB BLD-MCNC: 15.8 G/DL (ref 13.5–17.5)
IMM GRANULOCYTES # BLD AUTO: 0.04 X10*3/UL (ref 0–0.7)
IMM GRANULOCYTES NFR BLD AUTO: 0.4 % (ref 0–0.9)
LDH SERPL L TO P-CCNC: 130 U/L (ref 84–246)
LYMPHOCYTES # BLD AUTO: 2.11 X10*3/UL (ref 1.2–4.8)
LYMPHOCYTES NFR BLD AUTO: 20.4 %
MCH RBC QN AUTO: 30.7 PG (ref 26–34)
MCHC RBC AUTO-ENTMCNC: 34.3 G/DL (ref 32–36)
MCV RBC AUTO: 90 FL (ref 80–100)
MONOCYTES # BLD AUTO: 0.73 X10*3/UL (ref 0.1–1)
MONOCYTES NFR BLD AUTO: 7 %
NEUTROPHILS # BLD AUTO: 7.05 X10*3/UL (ref 1.2–7.7)
NEUTROPHILS NFR BLD AUTO: 68 %
PLATELET # BLD AUTO: 185 X10*3/UL (ref 150–450)
POTASSIUM SERPL-SCNC: 4.3 MMOL/L (ref 3.5–5.3)
PROT SERPL-MCNC: 5.9 G/DL (ref 6.4–8.2)
RBC # BLD AUTO: 5.14 X10*6/UL (ref 4.5–5.9)
SODIUM SERPL-SCNC: 140 MMOL/L (ref 136–145)
WBC # BLD AUTO: 10.4 X10*3/UL (ref 4.4–11.3)

## 2024-02-06 PROCEDURE — 80053 COMPREHEN METABOLIC PANEL: CPT

## 2024-02-06 PROCEDURE — 99214 OFFICE O/P EST MOD 30 MIN: CPT | Performed by: INTERNAL MEDICINE

## 2024-02-06 PROCEDURE — 83615 LACTATE (LD) (LDH) ENZYME: CPT

## 2024-02-06 PROCEDURE — 85025 COMPLETE CBC W/AUTO DIFF WBC: CPT

## 2024-02-06 PROCEDURE — 1159F MED LIST DOCD IN RCRD: CPT | Performed by: INTERNAL MEDICINE

## 2024-02-06 PROCEDURE — 36415 COLL VENOUS BLD VENIPUNCTURE: CPT

## 2024-02-06 PROCEDURE — 1036F TOBACCO NON-USER: CPT | Performed by: INTERNAL MEDICINE

## 2024-02-06 PROCEDURE — 1126F AMNT PAIN NOTED NONE PRSNT: CPT | Performed by: INTERNAL MEDICINE

## 2024-02-06 ASSESSMENT — PAIN SCALES - GENERAL: PAINLEVEL: 0-NO PAIN

## 2024-02-06 NOTE — PROGRESS NOTES
Patient ID: Yogesh Vázquez is a 63 y.o. male.  Referring Physician: No referring provider defined for this encounter.  Primary Care Provider: Artemio Armstrong MD  Visit Type: Follow Up      Subjective    HPI I am doing okay    Review of Systems   Constitutional: Negative.    HENT:  Negative.     Eyes: Negative.    Respiratory: Negative.     Cardiovascular: Negative.    Gastrointestinal: Negative.    Endocrine: Negative.    Genitourinary: Negative.     Musculoskeletal: Negative.    Skin: Negative.    Neurological: Negative.    Hematological: Negative.    Psychiatric/Behavioral: Negative.          Objective   BSA: There is no height or weight on file to calculate BSA.  There were no vitals taken for this visit.     has a past medical history of Personal history of antineoplastic chemotherapy.   has a past surgical history that includes Other surgical history (04/22/2019); Other surgical history (04/22/2019); Other surgical history (04/22/2019); Other surgical history (04/22/2019); Other surgical history (04/22/2019); Other surgical history (04/22/2019); Other surgical history (04/22/2019); Other surgical history (04/22/2019); and CT guided chest tube placement (4/11/2019).  Family History   Problem Relation Name Age of Onset    Lymphoma Other          non-Hodgkins    Cancer Other       Oncology History    No history exists.       Yogesh Vázquez  reports that he has quit smoking. His smoking use included cigarettes. He has never used smokeless tobacco.  He  has no history on file for alcohol use.  He  has no history on file for drug use.    Physical Exam  Vitals reviewed.   Constitutional:       Appearance: Normal appearance.   HENT:      Head: Normocephalic.      Mouth/Throat:      Mouth: Mucous membranes are moist.   Eyes:      Extraocular Movements: Extraocular movements intact.      Pupils: Pupils are equal, round, and reactive to light.   Cardiovascular:      Rate and Rhythm: Normal rate and regular rhythm.       Heart sounds: Normal heart sounds.   Pulmonary:      Breath sounds: Normal breath sounds.   Abdominal:      General: Bowel sounds are normal.      Palpations: Abdomen is soft.   Musculoskeletal:         General: Normal range of motion.      Cervical back: Normal range of motion and neck supple.   Skin:     General: Skin is warm.   Neurological:      General: No focal deficit present.      Mental Status: He is alert and oriented to person, place, and time.   Psychiatric:         Mood and Affect: Mood normal.         WBC   Date/Time Value Ref Range Status   02/06/2024 02:32 PM 10.4 4.4 - 11.3 x10*3/uL Final   02/07/2023 01:54 PM 8.2 4.4 - 11.3 x10E9/L Final   05/11/2022 05:43 AM 13.9 (H) 4.4 - 11.3 x10E9/L Final   04/29/2022 12:21 PM 8.9 4.4 - 11.3 x10E9/L Final     nRBC   Date Value Ref Range Status   05/11/2022 0.0 0.0 - 0.0 /100 WBC Final   04/29/2022 0.0 0.0 - 0.0 /100 WBC Final   04/15/2019 0.0 0.0 - 0.0 /100 WBC Final     RBC   Date Value Ref Range Status   02/06/2024 5.14 4.50 - 5.90 x10*6/uL Final   02/07/2023 5.13 4.50 - 5.90 x10E12/L Final   05/11/2022 4.53 4.50 - 5.90 x10E12/L Final   04/29/2022 5.15 4.50 - 5.90 x10E12/L Final     Hemoglobin   Date Value Ref Range Status   02/06/2024 15.8 13.5 - 17.5 g/dL Final   02/07/2023 15.4 13.5 - 17.5 g/dL Final   05/11/2022 13.7 13.5 - 17.5 g/dL Final   04/29/2022 15.5 13.5 - 17.5 g/dL Final     Hematocrit   Date Value Ref Range Status   02/06/2024 46.1 41.0 - 52.0 % Final   02/07/2023 45.4 41.0 - 52.0 % Final   05/11/2022 42.3 41.0 - 52.0 % Final   04/29/2022 47.2 41.0 - 52.0 % Final     MCV   Date/Time Value Ref Range Status   02/06/2024 02:32 PM 90 80 - 100 fL Final   02/07/2023 01:54 PM 88 80 - 100 fL Final   05/11/2022 05:43 AM 93 80 - 100 fL Final   04/29/2022 12:21 PM 92 80 - 100 fL Final     MCH   Date/Time Value Ref Range Status   02/06/2024 02:32 PM 30.7 26.0 - 34.0 pg Final     MCHC   Date/Time Value Ref Range Status   02/06/2024 02:32 PM 34.3 32.0 - 36.0  "g/dL Final   02/07/2023 01:54 PM 33.9 32.0 - 36.0 g/dL Final   05/11/2022 05:43 AM 32.4 32.0 - 36.0 g/dL Final   04/29/2022 12:21 PM 32.8 32.0 - 36.0 g/dL Final     RDW   Date/Time Value Ref Range Status   02/06/2024 02:32 PM 13.0 11.5 - 14.5 % Final   02/07/2023 01:54 PM 13.5 11.5 - 14.5 % Final   05/11/2022 05:43 AM 13.6 11.5 - 14.5 % Final   04/29/2022 12:21 PM 13.2 11.5 - 14.5 % Final     Platelets   Date/Time Value Ref Range Status   02/06/2024 02:32  150 - 450 x10*3/uL Final   02/07/2023 01:54  150 - 450 x10E9/L Final   05/11/2022 05:43  150 - 450 x10E9/L Final   04/29/2022 12:21  150 - 450 x10E9/L Final     No results found for: \"MPV\"  Neutrophils %   Date/Time Value Ref Range Status   02/06/2024 02:32 PM 68.0 40.0 - 80.0 % Final   02/07/2023 01:54 PM 67.7 40.0 - 80.0 % Final   05/11/2022 05:43 AM 81.7 40.0 - 80.0 % Final   04/29/2022 12:21 PM 75.5 40.0 - 80.0 % Final     Immature Granulocytes %, Automated   Date/Time Value Ref Range Status   02/06/2024 02:32 PM 0.4 0.0 - 0.9 % Final     Comment:     Immature Granulocyte Count (IG) includes promyelocytes, myelocytes and metamyelocytes but does not include bands. Percent differential counts (%) should be interpreted in the context of the absolute cell counts (cells/UL).   02/07/2023 01:54 PM 0.2 0.0 - 0.9 % Final     Comment:      Immature Granulocyte Count (IG) includes promyelocytes,    myelocytes and metamyelocytes but does not include bands.   Percent differential counts (%) should be interpreted in the   context of the absolute cell counts (cells/L).     05/11/2022 05:43 AM 0.8 0.0 - 0.9 % Final     Comment:      Immature Granulocyte Count (IG) includes promyelocytes,    myelocytes and metamyelocytes but does not include bands.   Percent differential counts (%) should be interpreted in the   context of the absolute cell counts (cells/L).     04/29/2022 12:21 PM 0.8 0.0 - 0.9 % Final     Comment:      Immature Granulocyte Count (IG) " includes promyelocytes,    myelocytes and metamyelocytes but does not include bands.   Percent differential counts (%) should be interpreted in the   context of the absolute cell counts (cells/L).       Lymphocytes %   Date/Time Value Ref Range Status   02/06/2024 02:32 PM 20.4 13.0 - 44.0 % Final   02/07/2023 01:54 PM 21.8 13.0 - 44.0 % Final   05/11/2022 05:43 AM 7.3 13.0 - 44.0 % Final   04/29/2022 12:21 PM 13.5 13.0 - 44.0 % Final   04/14/2019 04:30 AM 6.0 13.0 - 44.0 % Final     Monocytes %   Date/Time Value Ref Range Status   02/06/2024 02:32 PM 7.0 2.0 - 10.0 % Final   02/07/2023 01:54 PM 6.3 2.0 - 10.0 % Final   05/11/2022 05:43 AM 7.7 2.0 - 10.0 % Final   04/29/2022 12:21 PM 6.3 2.0 - 10.0 % Final   04/14/2019 04:30 AM 7.0 2.0 - 10.0 % Final     Eosinophils %   Date/Time Value Ref Range Status   02/06/2024 02:32 PM 3.8 0.0 - 6.0 % Final   02/07/2023 01:54 PM 3.4 0.0 - 6.0 % Final   05/11/2022 05:43 AM 2.1 0.0 - 6.0 % Final   04/29/2022 12:21 PM 3.3 0.0 - 6.0 % Final   04/14/2019 04:30 AM 0.0 0.0 - 6.0 % Final     Basophils %   Date/Time Value Ref Range Status   02/06/2024 02:32 PM 0.4 0.0 - 2.0 % Final   02/07/2023 01:54 PM 0.6 0.0 - 2.0 % Final   05/11/2022 05:43 AM 0.4 0.0 - 2.0 % Final   04/29/2022 12:21 PM 0.6 0.0 - 2.0 % Final   04/14/2019 04:30 AM 0.0 0.0 - 2.0 % Final     Neutrophils Absolute   Date/Time Value Ref Range Status   02/06/2024 02:32 PM 7.05 1.20 - 7.70 x10*3/uL Final     Comment:     Percent differential counts (%) should be interpreted in the context of the absolute cell counts (cells/uL).   02/07/2023 01:54 PM 5.51 1.20 - 7.70 x10E9/L Final   05/11/2022 05:43 AM 11.31 (H) 1.20 - 7.70 x10E9/L Final   04/29/2022 12:21 PM 6.73 1.20 - 7.70 x10E9/L Final     Immature Granulocytes Absolute, Automated   Date/Time Value Ref Range Status   02/06/2024 02:32 PM 0.04 0.00 - 0.70 x10*3/uL Final     Lymphocytes Absolute   Date/Time Value Ref Range Status   02/06/2024 02:32 PM 2.11 1.20 - 4.80  "x10*3/uL Final   02/07/2023 01:54 PM 1.78 1.20 - 4.80 x10E9/L Final   05/11/2022 05:43 AM 1.01 (L) 1.20 - 4.80 x10E9/L Final   04/29/2022 12:21 PM 1.20 1.20 - 4.80 x10E9/L Final     Monocytes Absolute   Date/Time Value Ref Range Status   02/06/2024 02:32 PM 0.73 0.10 - 1.00 x10*3/uL Final   02/07/2023 01:54 PM 0.51 0.10 - 1.00 x10E9/L Final   05/11/2022 05:43 AM 1.07 (H) 0.10 - 1.00 x10E9/L Final   04/29/2022 12:21 PM 0.56 0.10 - 1.00 x10E9/L Final     Eosinophils Absolute   Date/Time Value Ref Range Status   02/06/2024 02:32 PM 0.39 0.00 - 0.70 x10*3/uL Final   02/07/2023 01:54 PM 0.28 0.00 - 0.70 x10E9/L Final   05/11/2022 05:43 AM 0.29 0.00 - 0.70 x10E9/L Final   04/29/2022 12:21 PM 0.29 0.00 - 0.70 x10E9/L Final   04/14/2019 04:30 AM 0.00 0.00 - 0.70 x10E9/L Final     Basophils Absolute   Date/Time Value Ref Range Status   02/06/2024 02:32 PM 0.04 0.00 - 0.10 x10*3/uL Final   02/07/2023 01:54 PM 0.05 0.00 - 0.10 x10E9/L Final   05/11/2022 05:43 AM 0.06 0.00 - 0.10 x10E9/L Final   04/29/2022 12:21 PM 0.05 0.00 - 0.10 x10E9/L Final   04/14/2019 04:30 AM 0.00 0.00 - 0.10 x10E9/L Final       No components found for: \"PT\"  aPTT   Date/Time Value Ref Range Status   04/29/2022 12:21 PM 33 26 - 39 sec Final     Comment:       THE APTT IS NO LONGER USED FOR MONITORING     UNFRACTIONATED HEPARIN THERAPY.    FOR MONITORING HEPARIN THERAPY,     USE THE HEPARIN ASSAY.       Medication Documentation Review Audit       Reviewed by Kateryna Baker MA (Medical Assistant) on 02/06/24 at 1454      Medication Order Taking? Sig Documenting Provider Last Dose Status   albuterol (ProAir HFA) 90 mcg/actuation inhaler [id number] No Inhale 1-2 puffs every 4 hours if needed (every 4-6 hours as needed). Historical Provider, MD Not Taking Active   allopurinol (Zyloprim) 300 mg tablet [id number] No Take 1 tablet (300 mg) by mouth once daily. Historical Provider, MD Not Taking Active   aspirin 81 mg EC tablet [id number] No Take 1 tablet (81 mg) by " mouth 2 times a day. Historical MD Lennie Not Taking Active   celecoxib (CeleBREX) 200 mg capsule [id number] No Take by mouth once daily as needed. Jersey Hogue MD Not Taking Active   ciprofloxacin-dexamethasone (CiproDEX) otic suspension [id number] No Administer 4 drops into affected ear(s) twice a day. Jersey Hogue MD Not Taking Active   cyclobenzaprine (Flexeril) 10 mg tablet [id number] No Take 1 tablet (10 mg) by mouth once daily at bedtime. Jersey Hogue MD Not Taking Active   docusate sodium (Colace) 100 mg capsule [id number] No Take 1 capsule (100 mg) by mouth twice a day. Jersey Hogue MD Not Taking Active   fluticasone (Flonase) 50 mcg/actuation nasal spray [id number] Yes Administer 2 sprays into each nostril once daily. Jersey Hogue MD Taking Active   loratadine (Claritin Liqui-Gel) 10 mg capsule [id number] No Take by mouth. Jersey Hogue MD Not Taking Active   ofloxacin (Floxin) 0.3 % otic solution [id number] No Administer 4-5 drops into affected ear(s) 2 times a day. Historical MD Lennie Not Taking Active   omeprazole (PriLOSEC) 10 mg DR capsule [id number] Yes Take by mouth. Historical MD Lennie Taking Active   oxyCODONE-acetaminophen (Percocet) 5-325 mg tablet [id number] No Take 1 tablet by mouth every 8 hours. Jersey Hogue MD Not Taking Active   oxyCODONE-acetaminophen (Percocet) 5-325 mg tablet [id number] No Take 1 tablet by mouth every 6 hours if needed for severe pain (7 - 10). Jersey Hogue MD Not Taking Active   polyethylene glycol (Glycolax, Miralax) 17 gram/dose powder [id number] No Take 17 g by mouth twice a day. Historical MD Lennie Not Taking Active   predniSONE (Deltasone) 20 mg tablet [id number] No 2 tabs daily x3 days then 1 tablet daily for 2 days then 1/2 tablet for 2 days   Patient not taking: Reported on 2/6/2024    Rashaun Kulkarni PA-C Not Taking Active   tiotropium (Spiriva Respimat) 2.5 mcg/actuation inhaler [id number]  No Inhale 2 puffs once daily. Historical Provider, MD Not Taking Active   traMADol (Ultram) 50 mg tablet [id number] No Take 1 tablet (50 mg) by mouth every 8 hours. Historical Provider, MD Not Taking Active                   Assessment/Plan    1) follicular lymphoma  -diagnosed with stage IV follicular lymphoma in 2/2017  -completed bendamustine + rituxan x 6 followed by maintenance rituxan x 10  -here for interval followup  -continues to work part time  -wife continues to travel to [facility] performing LASIK surgeries  -doing well, no complaints  -reports no constitutional symptoms  -labs to be done today include CBC, COMP, and LDH  -results reviewed--wbc 10.4, hgb 15.8, plt 185,000, creatinine 0.91, AST 11, ALT 20,   -limited physical exam was done today--no cervical, supraclavicular or axillary or inguinal  adenopathy palpable  -will see him again in 1 year          2) Suazo's esophagus   -maintained on omeprazole      3) COPD  -on spiriva  -on fluticasone  -on proair     4) osteoarthritis  -on celebrex  -on percocet     5) gout  -on allopurinol     6) empyema  -hospitalized in April 2019 for pneumonia and empyema, requiring chest tube placement on the left side.           Problem List Items Addressed This Visit             ICD-10-CM    Non-Hodgkin's lymphoma (CMS/HCC) - Primary C85.90    Relevant Orders    CBC and Auto Differential (Completed)    Comprehensive Metabolic Panel (Completed)    Lactate dehydrogenase (Completed)    Clinic Appointment Request Follow Up; [facility]; [id number]    CBC and Auto Differential    Comprehensive metabolic panel    Lactate dehydrogenase            Lars Castillo MD

## 2024-02-17 PROBLEM — M1A.9XX0 CHRONIC GOUT WITHOUT TOPHUS: Status: ACTIVE | Noted: 2024-02-17

## 2024-02-17 PROBLEM — J86.9 EMPYEMA (MULTI): Status: ACTIVE | Noted: 2024-02-17

## 2024-02-17 ASSESSMENT — ENCOUNTER SYMPTOMS
MUSCULOSKELETAL NEGATIVE: 1
EYES NEGATIVE: 1
NEUROLOGICAL NEGATIVE: 1
RESPIRATORY NEGATIVE: 1
HEMATOLOGIC/LYMPHATIC NEGATIVE: 1
CONSTITUTIONAL NEGATIVE: 1
CARDIOVASCULAR NEGATIVE: 1
PSYCHIATRIC NEGATIVE: 1
GASTROINTESTINAL NEGATIVE: 1
ENDOCRINE NEGATIVE: 1

## 2024-07-19 ENCOUNTER — APPOINTMENT (OUTPATIENT)
Dept: OTOLARYNGOLOGY | Facility: CLINIC | Age: 64
End: 2024-07-19
Payer: COMMERCIAL

## 2024-07-19 DIAGNOSIS — H65.22 LEFT CHRONIC SEROUS OTITIS MEDIA: Primary | ICD-10-CM

## 2024-07-19 DIAGNOSIS — J01.90 ACUTE SINUSITIS, RECURRENCE NOT SPECIFIED, UNSPECIFIED LOCATION: ICD-10-CM

## 2024-07-19 PROCEDURE — 99213 OFFICE O/P EST LOW 20 MIN: CPT | Performed by: OTOLARYNGOLOGY

## 2024-07-19 PROCEDURE — 1160F RVW MEDS BY RX/DR IN RCRD: CPT | Performed by: OTOLARYNGOLOGY

## 2024-07-19 PROCEDURE — 1159F MED LIST DOCD IN RCRD: CPT | Performed by: OTOLARYNGOLOGY

## 2024-07-19 RX ORDER — DOXYCYCLINE 100 MG/1
100 TABLET ORAL 2 TIMES DAILY
Qty: 20 TABLET | Refills: 0 | Status: SHIPPED | OUTPATIENT
Start: 2024-07-19 | End: 2024-07-29

## 2024-07-19 NOTE — PROGRESS NOTES
Yogesh Vázquez is a 63 y.o. year old male patient with CLOGGED EAR     Patient presents to the office today for assessment of ears.  Patient with history of left PE tube.  Patient here today reporting that the ear has been having some popping cracking phenomenon but otherwise well.  He does have complaints of nasal congestion and yellow-green mucus consistent with sinusitis.  Patient is otherwise well without other concerns.        Physical Exam:   General appearance: No acute distress. Normal facies. Symmetric facial movement. No gross lesions of the face are noted.  The external ear structures appear normal.  Examination of the right ear is unremarkable. There is no evidence of middle ear effusion or abnormality of the external auditory canal, tympanic membrane, and external ear itself.  Left tube is in the process of extrusion.    Anterior rhinoscopy notes essentially a midline nasal septum.  Patient with evidence of acute sinusitis with mucopus noted on exam.  Examination is noted for normal healthy mucosal membranes without any evidence of lesions, polyps, or exudate. The tongue is normally mobile. There are no lesions on the gingiva, buccal, or oral mucosa. There are no oral cavity masses.  The neck is negative for mass lymphadenopathy. The trachea and parotid are clear. The thyroid bed is grossly unremarkable. The salivary gland structures are grossly unremarkable.    Assessment/Plan   1.  Sinusitis  Patient seen in the office today for assessment of ears and nose.  Patient has left tube in the process of extrusion but otherwise well.  The patient does have evidence of acute sinusitis and is to be started on doxycycline.  Will see the patient back in 6 months or sooner if necessary

## 2024-08-28 ENCOUNTER — APPOINTMENT (OUTPATIENT)
Dept: OTOLARYNGOLOGY | Facility: CLINIC | Age: 64
End: 2024-08-28
Payer: COMMERCIAL

## 2024-08-28 DIAGNOSIS — H65.22 LEFT CHRONIC SEROUS OTITIS MEDIA: Primary | ICD-10-CM

## 2024-08-28 PROCEDURE — 69433 CREATE EARDRUM OPENING: CPT | Performed by: OTOLARYNGOLOGY

## 2024-08-28 NOTE — PROGRESS NOTES
Yogesh Vázquez is a 63 y.o. year old male patient with FU ON LEFT EAR     The patient presents to the office today for assessment of ears.  Patient here today for left ear with a known history of chronic effusion left ear with previous tube placements.  Patient again complaining that the ear is full clogged and blocked.  He is without other ENT related concerns at this time.        Physical Exam:   General appearance: No acute distress. Normal facies. Symmetric facial movement. No gross lesions of the face are noted.  The external ear structures appear normal.  Examination of the right ear is unremarkable. There is no evidence of middle ear effusion or abnormality of the external auditory canal, tympanic membrane, and external ear itself.  Left ear noted for evidence of chronic effusion.  Anterior rhinoscopy notes essentially a midline nasal septum. Examination is noted for normal healthy mucosal membranes without any evidence of lesions, polyps, or exudate. The tongue is normally mobile. There are no lesions on the gingiva, buccal, or oral mucosa. There are no oral cavity masses.  The neck is negative for mass lymphadenopathy. The trachea and parotid are clear. The thyroid bed is grossly unremarkable. The salivary gland structures are grossly unremarkable    After obtaining consent from the patient a left myringotomy with tube placement was performed.  Phenol was utilized for topical anesthesia and incision was then made.  Effusion removed with #3 suction.  Tube was then placed with alligator forceps and Hernandez needle.  Patient tolerated this well and noted subjective improvement in hearing.      Assessment/Plan   1.  Chronic otitis media    Patient seen in the office today for assessment of ears of chronic otitis media left ear.  Patient is now status post tube placement.  Recommendation for the patient at this time is observation and follow-up in 6 months or sooner if necessary.

## 2024-09-16 ENCOUNTER — APPOINTMENT (OUTPATIENT)
Dept: OTOLARYNGOLOGY | Facility: CLINIC | Age: 64
End: 2024-09-16
Payer: COMMERCIAL

## 2024-11-18 ENCOUNTER — HOSPITAL ENCOUNTER (OUTPATIENT)
Dept: RESPIRATORY THERAPY | Facility: HOSPITAL | Age: 64
Discharge: HOME | End: 2024-11-18
Payer: COMMERCIAL

## 2024-11-18 DIAGNOSIS — J44.89 COPD (CHRONIC OBSTRUCTIVE PULMONARY DISEASE) WITH CHRONIC BRONCHITIS (MULTI): ICD-10-CM

## 2024-11-18 PROCEDURE — 94726 PLETHYSMOGRAPHY LUNG VOLUMES: CPT | Performed by: INTERNAL MEDICINE

## 2024-11-18 PROCEDURE — 94726 PLETHYSMOGRAPHY LUNG VOLUMES: CPT

## 2024-11-18 PROCEDURE — 94060 EVALUATION OF WHEEZING: CPT | Performed by: INTERNAL MEDICINE

## 2024-11-18 PROCEDURE — 94729 DIFFUSING CAPACITY: CPT | Performed by: INTERNAL MEDICINE

## 2024-11-21 LAB
MGC ASCENT PFT - FEV1 - POST: 2.5
MGC ASCENT PFT - FEV1 - PRE: 2.45
MGC ASCENT PFT - FEV1 - PREDICTED: 3.41
MGC ASCENT PFT - FVC - POST: 4.15
MGC ASCENT PFT - FVC - PRE: 3.31
MGC ASCENT PFT - FVC - PREDICTED: 4.5

## 2025-02-04 ENCOUNTER — OFFICE VISIT (OUTPATIENT)
Dept: HEMATOLOGY/ONCOLOGY | Facility: CLINIC | Age: 65
End: 2025-02-04
Payer: COMMERCIAL

## 2025-02-04 ENCOUNTER — LAB (OUTPATIENT)
Dept: LAB | Facility: CLINIC | Age: 65
End: 2025-02-04
Payer: COMMERCIAL

## 2025-02-04 VITALS
TEMPERATURE: 97.7 F | HEART RATE: 77 BPM | BODY MASS INDEX: 31.1 KG/M2 | OXYGEN SATURATION: 95 % | RESPIRATION RATE: 16 BRPM | DIASTOLIC BLOOD PRESSURE: 72 MMHG | WEIGHT: 229.28 LBS | SYSTOLIC BLOOD PRESSURE: 155 MMHG

## 2025-02-04 DIAGNOSIS — C82.53 DIFFUSE FOLLICLE CENTER LYMPHOMA OF INTRA-ABDOMINAL LYMPH NODES (MULTI): ICD-10-CM

## 2025-02-04 DIAGNOSIS — C83.38 DIFFUSE LARGE B-CELL LYMPHOMA OF LYMPH NODES OF MULTIPLE REGIONS (MULTI): ICD-10-CM

## 2025-02-04 DIAGNOSIS — M15.9 OSTEOARTHRITIS OF MULTIPLE JOINTS, UNSPECIFIED OSTEOARTHRITIS TYPE: ICD-10-CM

## 2025-02-04 DIAGNOSIS — M1A.9XX0 CHRONIC GOUT WITHOUT TOPHUS, UNSPECIFIED CAUSE, UNSPECIFIED SITE: ICD-10-CM

## 2025-02-04 DIAGNOSIS — K22.70 BARRETT'S ESOPHAGUS WITHOUT DYSPLASIA: Primary | ICD-10-CM

## 2025-02-04 DIAGNOSIS — J86.9 EMPYEMA (MULTI): ICD-10-CM

## 2025-02-04 DIAGNOSIS — J44.9 CHRONIC OBSTRUCTIVE PULMONARY DISEASE, UNSPECIFIED COPD TYPE (MULTI): ICD-10-CM

## 2025-02-04 LAB
ALBUMIN SERPL BCP-MCNC: 4.8 G/DL (ref 3.4–5)
ALP SERPL-CCNC: 67 U/L (ref 33–136)
ALT SERPL W P-5'-P-CCNC: 24 U/L (ref 10–52)
ANION GAP SERPL CALC-SCNC: 11 MMOL/L (ref 10–20)
AST SERPL W P-5'-P-CCNC: 13 U/L (ref 9–39)
BASOPHILS # BLD AUTO: 0.05 X10*3/UL (ref 0–0.1)
BASOPHILS NFR BLD AUTO: 0.6 %
BILIRUB SERPL-MCNC: 0.6 MG/DL (ref 0–1.2)
BUN SERPL-MCNC: 14 MG/DL (ref 6–23)
CALCIUM SERPL-MCNC: 9.1 MG/DL (ref 8.6–10.3)
CHLORIDE SERPL-SCNC: 101 MMOL/L (ref 98–107)
CO2 SERPL-SCNC: 33 MMOL/L (ref 21–32)
CREAT SERPL-MCNC: 0.77 MG/DL (ref 0.5–1.3)
EGFRCR SERPLBLD CKD-EPI 2021: >90 ML/MIN/1.73M*2
EOSINOPHIL # BLD AUTO: 0.26 X10*3/UL (ref 0–0.7)
EOSINOPHIL NFR BLD AUTO: 3.3 %
ERYTHROCYTE [DISTWIDTH] IN BLOOD BY AUTOMATED COUNT: 13 % (ref 11.5–14.5)
GLUCOSE SERPL-MCNC: 103 MG/DL (ref 74–99)
HCT VFR BLD AUTO: 46 % (ref 41–52)
HGB BLD-MCNC: 15.6 G/DL (ref 13.5–17.5)
IMM GRANULOCYTES # BLD AUTO: 0.04 X10*3/UL (ref 0–0.7)
IMM GRANULOCYTES NFR BLD AUTO: 0.5 % (ref 0–0.9)
LDH SERPL L TO P-CCNC: 127 U/L (ref 84–246)
LYMPHOCYTES # BLD AUTO: 1.23 X10*3/UL (ref 1.2–4.8)
LYMPHOCYTES NFR BLD AUTO: 15.4 %
MCH RBC QN AUTO: 30.5 PG (ref 26–34)
MCHC RBC AUTO-ENTMCNC: 33.9 G/DL (ref 32–36)
MCV RBC AUTO: 90 FL (ref 80–100)
MONOCYTES # BLD AUTO: 0.75 X10*3/UL (ref 0.1–1)
MONOCYTES NFR BLD AUTO: 9.4 %
NEUTROPHILS # BLD AUTO: 5.67 X10*3/UL (ref 1.2–7.7)
NEUTROPHILS NFR BLD AUTO: 70.8 %
PLATELET # BLD AUTO: 153 X10*3/UL (ref 150–450)
POTASSIUM SERPL-SCNC: 4.1 MMOL/L (ref 3.5–5.3)
PROT SERPL-MCNC: 6.2 G/DL (ref 6.4–8.2)
RBC # BLD AUTO: 5.12 X10*6/UL (ref 4.5–5.9)
SODIUM SERPL-SCNC: 141 MMOL/L (ref 136–145)
WBC # BLD AUTO: 8 X10*3/UL (ref 4.4–11.3)

## 2025-02-04 PROCEDURE — 36415 COLL VENOUS BLD VENIPUNCTURE: CPT

## 2025-02-04 PROCEDURE — 83615 LACTATE (LD) (LDH) ENZYME: CPT

## 2025-02-04 PROCEDURE — 80053 COMPREHEN METABOLIC PANEL: CPT

## 2025-02-04 PROCEDURE — 1126F AMNT PAIN NOTED NONE PRSNT: CPT | Performed by: INTERNAL MEDICINE

## 2025-02-04 PROCEDURE — 85025 COMPLETE CBC W/AUTO DIFF WBC: CPT

## 2025-02-04 PROCEDURE — 99214 OFFICE O/P EST MOD 30 MIN: CPT | Performed by: INTERNAL MEDICINE

## 2025-02-04 PROCEDURE — 1160F RVW MEDS BY RX/DR IN RCRD: CPT | Performed by: INTERNAL MEDICINE

## 2025-02-04 PROCEDURE — 1159F MED LIST DOCD IN RCRD: CPT | Performed by: INTERNAL MEDICINE

## 2025-02-04 ASSESSMENT — ENCOUNTER SYMPTOMS
ENDOCRINE NEGATIVE: 1
MUSCULOSKELETAL NEGATIVE: 1
CARDIOVASCULAR NEGATIVE: 1
EYES NEGATIVE: 1
PSYCHIATRIC NEGATIVE: 1
CONSTITUTIONAL NEGATIVE: 1
NEUROLOGICAL NEGATIVE: 1
GASTROINTESTINAL NEGATIVE: 1
RESPIRATORY NEGATIVE: 1
HEMATOLOGIC/LYMPHATIC NEGATIVE: 1

## 2025-02-04 ASSESSMENT — PAIN SCALES - GENERAL: PAINLEVEL_OUTOF10: 0-NO PAIN

## 2025-02-04 NOTE — PROGRESS NOTES
Patient ID: Yogesh Vázquez is a 64 y.o. male.  Referring Physician: Lars Castillo MD  [address]  [place] [address]  Primary Care Provider: Artemio Armstrong MD  Visit Type: Follow Up      Subjective    HPI I am doing okay    Review of Systems   Constitutional: Negative.    HENT:  Negative.     Eyes: Negative.    Respiratory: Negative.     Cardiovascular: Negative.    Gastrointestinal: Negative.    Endocrine: Negative.    Genitourinary: Negative.     Musculoskeletal: Negative.    Skin: Negative.    Neurological: Negative.    Hematological: Negative.    Psychiatric/Behavioral: Negative.          Objective   BSA: There is no height or weight on file to calculate BSA.  There were no vitals taken for this visit.     has a past medical history of Personal history of antineoplastic chemotherapy.   has a past surgical history that includes Other surgical history (04/22/2019); Other surgical history (04/22/2019); Other surgical history (04/22/2019); Other surgical history (04/22/2019); Other surgical history (04/22/2019); Other surgical history (04/22/2019); Other surgical history (04/22/2019); Other surgical history (04/22/2019); and CT guided chest tube placement (4/11/2019).  Family History   Problem Relation Name Age of Onset    Lymphoma Other          non-Hodgkins    Cancer Other       Oncology History    No history exists.       Yogesh Vázquez  reports that he has quit smoking. His smoking use included cigarettes. He has never used smokeless tobacco.  He  has no history on file for alcohol use.  He  has no history on file for drug use.    Physical Exam  Vitals reviewed.   Constitutional:       Appearance: Normal appearance.   HENT:      Head: Normocephalic.      Mouth/Throat:      Mouth: Mucous membranes are moist.   Eyes:      Extraocular Movements: Extraocular movements intact.      Pupils: Pupils are equal, round, and reactive to light.   Cardiovascular:      Rate and Rhythm: Normal rate and  regular rhythm.      Pulses: Normal pulses.      Heart sounds: Normal heart sounds.   Pulmonary:      Effort: Pulmonary effort is normal.      Breath sounds: Normal breath sounds.   Abdominal:      General: Bowel sounds are normal.      Palpations: Abdomen is soft.   Musculoskeletal:         General: Normal range of motion.      Cervical back: Normal range of motion and neck supple.   Skin:     General: Skin is warm.   Neurological:      General: No focal deficit present.      Mental Status: He is alert and oriented to person, place, and time.   Psychiatric:         Mood and Affect: Mood normal.         Behavior: Behavior normal.         WBC   Date/Time Value Ref Range Status   02/04/2025 02:03 PM 8.0 4.4 - 11.3 x10*3/uL Final   02/06/2024 02:32 PM 10.4 4.4 - 11.3 x10*3/uL Final   02/07/2023 01:54 PM 8.2 4.4 - 11.3 x10E9/L Final   05/11/2022 05:43 AM 13.9 (H) 4.4 - 11.3 x10E9/L Final   04/29/2022 12:21 PM 8.9 4.4 - 11.3 x10E9/L Final     nRBC   Date Value Ref Range Status   05/11/2022 0.0 0.0 - 0.0 /100 WBC Final   04/29/2022 0.0 0.0 - 0.0 /100 WBC Final   04/15/2019 0.0 0.0 - 0.0 /100 WBC Final     RBC   Date Value Ref Range Status   02/04/2025 5.12 4.50 - 5.90 x10*6/uL Final   02/06/2024 5.14 4.50 - 5.90 x10*6/uL Final   02/07/2023 5.13 4.50 - 5.90 x10E12/L Final   05/11/2022 4.53 4.50 - 5.90 x10E12/L Final   04/29/2022 5.15 4.50 - 5.90 x10E12/L Final     Hemoglobin   Date Value Ref Range Status   02/04/2025 15.6 13.5 - 17.5 g/dL Final   02/06/2024 15.8 13.5 - 17.5 g/dL Final   02/07/2023 15.4 13.5 - 17.5 g/dL Final   05/11/2022 13.7 13.5 - 17.5 g/dL Final   04/29/2022 15.5 13.5 - 17.5 g/dL Final     Hematocrit   Date Value Ref Range Status   02/04/2025 46.0 41.0 - 52.0 % Final   02/06/2024 46.1 41.0 - 52.0 % Final   02/07/2023 45.4 41.0 - 52.0 % Final   05/11/2022 42.3 41.0 - 52.0 % Final   04/29/2022 47.2 41.0 - 52.0 % Final     MCV   Date/Time Value Ref Range Status   02/04/2025 02:03 PM 90 80 - 100 fL Final  "  02/06/2024 02:32 PM 90 80 - 100 fL Final   02/07/2023 01:54 PM 88 80 - 100 fL Final   05/11/2022 05:43 AM 93 80 - 100 fL Final   04/29/2022 12:21 PM 92 80 - 100 fL Final     MCH   Date/Time Value Ref Range Status   02/04/2025 02:03 PM 30.5 26.0 - 34.0 pg Final   02/06/2024 02:32 PM 30.7 26.0 - 34.0 pg Final     MCHC   Date/Time Value Ref Range Status   02/04/2025 02:03 PM 33.9 32.0 - 36.0 g/dL Final   02/06/2024 02:32 PM 34.3 32.0 - 36.0 g/dL Final   02/07/2023 01:54 PM 33.9 32.0 - 36.0 g/dL Final   05/11/2022 05:43 AM 32.4 32.0 - 36.0 g/dL Final   04/29/2022 12:21 PM 32.8 32.0 - 36.0 g/dL Final     RDW   Date/Time Value Ref Range Status   02/04/2025 02:03 PM 13.0 11.5 - 14.5 % Final   02/06/2024 02:32 PM 13.0 11.5 - 14.5 % Final   02/07/2023 01:54 PM 13.5 11.5 - 14.5 % Final   05/11/2022 05:43 AM 13.6 11.5 - 14.5 % Final   04/29/2022 12:21 PM 13.2 11.5 - 14.5 % Final     Platelets   Date/Time Value Ref Range Status   02/04/2025 02:03  150 - 450 x10*3/uL Final   02/06/2024 02:32  150 - 450 x10*3/uL Final   02/07/2023 01:54  150 - 450 x10E9/L Final   05/11/2022 05:43  150 - 450 x10E9/L Final   04/29/2022 12:21  150 - 450 x10E9/L Final     No results found for: \"MPV\"  Neutrophils %   Date/Time Value Ref Range Status   02/04/2025 02:03 PM 70.8 40.0 - 80.0 % Final   02/06/2024 02:32 PM 68.0 40.0 - 80.0 % Final   02/07/2023 01:54 PM 67.7 40.0 - 80.0 % Final   05/11/2022 05:43 AM 81.7 40.0 - 80.0 % Final   04/29/2022 12:21 PM 75.5 40.0 - 80.0 % Final     Immature Granulocytes %, Automated   Date/Time Value Ref Range Status   02/04/2025 02:03 PM 0.5 0.0 - 0.9 % Final     Comment:     Immature Granulocyte Count (IG) includes promyelocytes, myelocytes and metamyelocytes but does not include bands. Percent differential counts (%) should be interpreted in the context of the absolute cell counts (cells/UL).   02/06/2024 02:32 PM 0.4 0.0 - 0.9 % Final     Comment:     Immature Granulocyte Count " (IG) includes promyelocytes, myelocytes and metamyelocytes but does not include bands. Percent differential counts (%) should be interpreted in the context of the absolute cell counts (cells/UL).   02/07/2023 01:54 PM 0.2 0.0 - 0.9 % Final     Comment:      Immature Granulocyte Count (IG) includes promyelocytes,    myelocytes and metamyelocytes but does not include bands.   Percent differential counts (%) should be interpreted in the   context of the absolute cell counts (cells/L).     05/11/2022 05:43 AM 0.8 0.0 - 0.9 % Final     Comment:      Immature Granulocyte Count (IG) includes promyelocytes,    myelocytes and metamyelocytes but does not include bands.   Percent differential counts (%) should be interpreted in the   context of the absolute cell counts (cells/L).     04/29/2022 12:21 PM 0.8 0.0 - 0.9 % Final     Comment:      Immature Granulocyte Count (IG) includes promyelocytes,    myelocytes and metamyelocytes but does not include bands.   Percent differential counts (%) should be interpreted in the   context of the absolute cell counts (cells/L).       Lymphocytes %   Date/Time Value Ref Range Status   02/04/2025 02:03 PM 15.4 13.0 - 44.0 % Final   02/06/2024 02:32 PM 20.4 13.0 - 44.0 % Final   02/07/2023 01:54 PM 21.8 13.0 - 44.0 % Final   05/11/2022 05:43 AM 7.3 13.0 - 44.0 % Final   04/29/2022 12:21 PM 13.5 13.0 - 44.0 % Final   04/14/2019 04:30 AM 6.0 13.0 - 44.0 % Final     Monocytes %   Date/Time Value Ref Range Status   02/04/2025 02:03 PM 9.4 2.0 - 10.0 % Final   02/06/2024 02:32 PM 7.0 2.0 - 10.0 % Final   02/07/2023 01:54 PM 6.3 2.0 - 10.0 % Final   05/11/2022 05:43 AM 7.7 2.0 - 10.0 % Final   04/29/2022 12:21 PM 6.3 2.0 - 10.0 % Final   04/14/2019 04:30 AM 7.0 2.0 - 10.0 % Final     Eosinophils %   Date/Time Value Ref Range Status   02/04/2025 02:03 PM 3.3 0.0 - 6.0 % Final   02/06/2024 02:32 PM 3.8 0.0 - 6.0 % Final   02/07/2023 01:54 PM 3.4 0.0 - 6.0 % Final   05/11/2022 05:43 AM 2.1 0.0 - 6.0  % Final   04/29/2022 12:21 PM 3.3 0.0 - 6.0 % Final   04/14/2019 04:30 AM 0.0 0.0 - 6.0 % Final     Basophils %   Date/Time Value Ref Range Status   02/04/2025 02:03 PM 0.6 0.0 - 2.0 % Final   02/06/2024 02:32 PM 0.4 0.0 - 2.0 % Final   02/07/2023 01:54 PM 0.6 0.0 - 2.0 % Final   05/11/2022 05:43 AM 0.4 0.0 - 2.0 % Final   04/29/2022 12:21 PM 0.6 0.0 - 2.0 % Final   04/14/2019 04:30 AM 0.0 0.0 - 2.0 % Final     Neutrophils Absolute   Date/Time Value Ref Range Status   02/04/2025 02:03 PM 5.67 1.20 - 7.70 x10*3/uL Final     Comment:     Percent differential counts (%) should be interpreted in the context of the absolute cell counts (cells/uL).   02/06/2024 02:32 PM 7.05 1.20 - 7.70 x10*3/uL Final     Comment:     Percent differential counts (%) should be interpreted in the context of the absolute cell counts (cells/uL).   02/07/2023 01:54 PM 5.51 1.20 - 7.70 x10E9/L Final   05/11/2022 05:43 AM 11.31 (H) 1.20 - 7.70 x10E9/L Final   04/29/2022 12:21 PM 6.73 1.20 - 7.70 x10E9/L Final     Immature Granulocytes Absolute, Automated   Date/Time Value Ref Range Status   02/04/2025 02:03 PM 0.04 0.00 - 0.70 x10*3/uL Final   02/06/2024 02:32 PM 0.04 0.00 - 0.70 x10*3/uL Final     Lymphocytes Absolute   Date/Time Value Ref Range Status   02/04/2025 02:03 PM 1.23 1.20 - 4.80 x10*3/uL Final   02/06/2024 02:32 PM 2.11 1.20 - 4.80 x10*3/uL Final   02/07/2023 01:54 PM 1.78 1.20 - 4.80 x10E9/L Final   05/11/2022 05:43 AM 1.01 (L) 1.20 - 4.80 x10E9/L Final   04/29/2022 12:21 PM 1.20 1.20 - 4.80 x10E9/L Final     Monocytes Absolute   Date/Time Value Ref Range Status   02/04/2025 02:03 PM 0.75 0.10 - 1.00 x10*3/uL Final   02/06/2024 02:32 PM 0.73 0.10 - 1.00 x10*3/uL Final   02/07/2023 01:54 PM 0.51 0.10 - 1.00 x10E9/L Final   05/11/2022 05:43 AM 1.07 (H) 0.10 - 1.00 x10E9/L Final   04/29/2022 12:21 PM 0.56 0.10 - 1.00 x10E9/L Final     Eosinophils Absolute   Date/Time Value Ref Range Status   02/04/2025 02:03 PM 0.26 0.00 - 0.70  "x10*3/uL Final   02/06/2024 02:32 PM 0.39 0.00 - 0.70 x10*3/uL Final   02/07/2023 01:54 PM 0.28 0.00 - 0.70 x10E9/L Final   05/11/2022 05:43 AM 0.29 0.00 - 0.70 x10E9/L Final   04/29/2022 12:21 PM 0.29 0.00 - 0.70 x10E9/L Final   04/14/2019 04:30 AM 0.00 0.00 - 0.70 x10E9/L Final     Basophils Absolute   Date/Time Value Ref Range Status   02/04/2025 02:03 PM 0.05 0.00 - 0.10 x10*3/uL Final   02/06/2024 02:32 PM 0.04 0.00 - 0.10 x10*3/uL Final   02/07/2023 01:54 PM 0.05 0.00 - 0.10 x10E9/L Final   05/11/2022 05:43 AM 0.06 0.00 - 0.10 x10E9/L Final   04/29/2022 12:21 PM 0.05 0.00 - 0.10 x10E9/L Final   04/14/2019 04:30 AM 0.00 0.00 - 0.10 x10E9/L Final       No components found for: \"PT\"  aPTT   Date/Time Value Ref Range Status   04/29/2022 12:21 PM 33 26 - 39 sec Final     Comment:       THE APTT IS NO LONGER USED FOR MONITORING     UNFRACTIONATED HEPARIN THERAPY.    FOR MONITORING HEPARIN THERAPY,     USE THE HEPARIN ASSAY.       Medication Documentation Review Audit       Reviewed by Loraine Roberts MA (Medical Assistant) on 02/04/25 at 1424      Medication Order Taking? Sig Documenting Provider Last Dose Status   albuterol (ProAir HFA) 90 mcg/actuation inhaler [id number] Yes Inhale 1-2 puffs every 4 hours if needed (every 4-6 hours as needed). Historical Provider, MD  Active   allopurinol (Zyloprim) 300 mg tablet [id number]  Take 1 tablet (300 mg) by mouth once daily.   Patient not taking: Reported on 2/4/2025    Historical Provider, MD  Active   aspirin 81 mg EC tablet [id number]  Take 1 tablet (81 mg) by mouth 2 times a day.   Patient not taking: Reported on 2/4/2025    Historical Provider, MD  Active   celecoxib (CeleBREX) 200 mg capsule [id number]  Take by mouth once daily as needed.   Patient not taking: Reported on 2/4/2025    Historical Provider, MD  Active   ciprofloxacin-dexamethasone (CiproDEX) otic suspension [id number]  Administer 4 drops into affected ear(s) twice a day.   Patient not taking: Reported on " 2/4/2025    Historical Provider, MD  Active   cyclobenzaprine (Flexeril) 10 mg tablet [id number]  Take 1 tablet (10 mg) by mouth once daily at bedtime.   Patient not taking: Reported on 2/4/2025    Historical Provider, MD  Active   docusate sodium (Colace) 100 mg capsule [id number]  Take 1 capsule (100 mg) by mouth twice a day.   Patient not taking: Reported on 2/4/2025    Historical Provider, MD  Active   fluticasone (Flonase) 50 mcg/actuation nasal spray [id number]  Administer 2 sprays into each nostril once daily.   Patient not taking: Reported on 2/4/2025    Historical Provider, MD  Active   loratadine (Claritin Liqui-Gel) 10 mg capsule [id number]  Take by mouth.   Patient not taking: Reported on 2/4/2025    Historical Provider, MD  Active   ofloxacin (Floxin) 0.3 % otic solution [id number]  Administer 4-5 drops into affected ear(s) 2 times a day.   Patient not taking: Reported on 2/4/2025    Historical Provider, MD  Active   omeprazole (PriLOSEC) 10 mg DR capsule [id number] Yes Take by mouth. Historical Provider, MD  Active   oxyCODONE-acetaminophen (Percocet) 5-325 mg tablet [id number]  Take 1 tablet by mouth every 8 hours.   Patient not taking: Reported on 2/4/2025    Historical Provider, MD  Active   oxyCODONE-acetaminophen (Percocet) 5-325 mg tablet [id number]  Take 1 tablet by mouth every 6 hours if needed for severe pain (7 - 10).   Patient not taking: Reported on 2/4/2025    Historical Provider, MD  Active   polyethylene glycol (Glycolax, Miralax) 17 gram/dose powder [id number]  Take 17 g by mouth twice a day.   Patient not taking: Mix of powder and drink. Reported on 2/4/2025    Historical Provider, MD  Active   predniSONE (Deltasone) 20 mg tablet [id number]  2 tabs daily x3 days then 1 tablet daily for 2 days then 1/2 tablet for 2 days   Patient not taking: Reported on 2/4/2025    Rashaun Kulkarni PA-C  Active   tiotropium (Spiriva Respimat) 2.5 mcg/actuation inhaler [id number] Yes Inhale 2 puffs once daily.  Historical Provider, MD  Active   traMADol (Ultram) 50 mg tablet [id number]  Take 1 tablet (50 mg) by mouth every 8 hours.   Patient not taking: Reported on 2/4/2025    Historical Provider, MD  Active                   Assessment/Plan    1) follicular lymphoma  -diagnosed with stage IV follicular lymphoma in 2/2017  -completed bendamustine + rituxan x 6 followed by maintenance rituxan x 10  -here for interval followup  -continues to work part time  -wife continues to travel to [place] and [place] performing LASIK surgeries, she also teaches in [place]  -doing well, no complaints  -reports no constitutional symptoms  -labs to be done today include CBC, COMP, and LDH  -results reviewed--wbc 8.0, hgb 15.6, plt 153,000, creatinine 0.77, AST 13 ALT 24,   -limited physical exam was done today--no cervical, supraclavicular or axillary or inguinal  adenopathy palpable  -will see him again in 1 year           2) Suazo's esophagus   -maintained on omeprazole      3) COPD  -on spiriva  -on fluticasone  -on proair     4) osteoarthritis  -on celebrex  -on percocet     5) gout  -on allopurinol     6) empyema  -hospitalized in April 2019 for pneumonia and empyema, requiring chest tube placement on the left side.         Problem List Items Addressed This Visit             ICD-10-CM    Non-Hodgkin's lymphoma C85.90            Lars Castillo MD

## 2025-02-26 ENCOUNTER — HOSPITAL ENCOUNTER (OUTPATIENT)
Dept: CARDIOLOGY | Facility: HOSPITAL | Age: 65
Discharge: HOME | End: 2025-02-26
Payer: COMMERCIAL

## 2025-02-26 DIAGNOSIS — I73.9 PERIPHERAL VASCULAR DISEASE, UNSPECIFIED (CMS-HCC): ICD-10-CM

## 2025-02-26 DIAGNOSIS — I70.213 ATHEROSCLEROSIS OF NATIVE ARTERIES OF EXTREMITIES WITH INTERMITTENT CLAUDICATION, BILATERAL LEGS (CMS-HCC): ICD-10-CM

## 2025-02-26 PROCEDURE — 93924 LWR XTR VASC STDY BILAT: CPT

## 2025-02-26 PROCEDURE — 93924 LWR XTR VASC STDY BILAT: CPT | Performed by: INTERNAL MEDICINE

## 2025-08-11 ENCOUNTER — HOSPITAL ENCOUNTER (OUTPATIENT)
Dept: RADIOLOGY | Facility: HOSPITAL | Age: 65
Discharge: HOME | End: 2025-08-11
Payer: COMMERCIAL

## 2025-08-11 DIAGNOSIS — J18.9 PNEUMONIA, UNSPECIFIED ORGANISM: ICD-10-CM

## 2025-08-11 PROCEDURE — 71046 X-RAY EXAM CHEST 2 VIEWS: CPT | Performed by: RADIOLOGY

## 2025-08-11 PROCEDURE — 71046 X-RAY EXAM CHEST 2 VIEWS: CPT

## 2025-08-15 ENCOUNTER — APPOINTMENT (OUTPATIENT)
Dept: OTOLARYNGOLOGY | Facility: CLINIC | Age: 65
End: 2025-08-15
Payer: COMMERCIAL

## 2025-08-15 DIAGNOSIS — H65.22 LEFT CHRONIC SEROUS OTITIS MEDIA: Primary | ICD-10-CM

## 2025-08-15 PROCEDURE — 1160F RVW MEDS BY RX/DR IN RCRD: CPT | Performed by: OTOLARYNGOLOGY

## 2025-08-15 PROCEDURE — 99212 OFFICE O/P EST SF 10 MIN: CPT | Performed by: OTOLARYNGOLOGY

## 2025-08-15 PROCEDURE — 1159F MED LIST DOCD IN RCRD: CPT | Performed by: OTOLARYNGOLOGY

## 2025-09-19 ENCOUNTER — APPOINTMENT (OUTPATIENT)
Dept: OTOLARYNGOLOGY | Facility: CLINIC | Age: 65
End: 2025-09-19
Payer: COMMERCIAL

## (undated) DEVICE — MAT FLR SURG QUICKWICK 28X54 IN DISP

## (undated) DEVICE — SUTURE MCRYL + SZ 3-0 L36IN ABSRB UD CT-1 L36MM 1/2 CIR MCP944H

## (undated) DEVICE — 3M™ STERI-STRIP™ REINFORCED ADHESIVE SKIN CLOSURES, R1547, 1/2 IN X 4 IN (12 MM X 100 MM), 6 STRIPS/ENVELOPE: Brand: 3M™ STERI-STRIP™

## (undated) DEVICE — ELECTRODE PT RET AD L9FT HI MOIST COND ADH HYDRGEL CORDED

## (undated) DEVICE — Z DISCONTINUED USE 2744636  DRESSING AQUACEL 14 IN ALG W3.5XL14IN POLYUR FLM CVR W/ HYDRCOLL

## (undated) DEVICE — T4 HOOD

## (undated) DEVICE — GLOVE ORANGE PI 8   MSG9080

## (undated) DEVICE — BIT DRL L30MM MOD FLEX DISP FOR ACET CUP SCR

## (undated) DEVICE — BLADE SAW W098XL276IN THK0025IN CUT THK0039IN REPL SAG

## (undated) DEVICE — SUTURE TICRN BR BL NDL C-20 SZ 5 30 8886302779

## (undated) DEVICE — SUTURE VCRL + SZ 2-0 L36IN ABSRB UD L36MM CT-1 1/2 CIR VCP945H

## (undated) DEVICE — SUTURE VCRL SZ 1 L36IN ABSRB UD L36MM CT-1 1/2 CIR J947H

## (undated) DEVICE — SIPS DUAL 2 MINUTE TIP

## (undated) DEVICE — 3M™ STERI-DRAPE™ U-DRAPE 1015: Brand: STERI-DRAPE™

## (undated) DEVICE — SPONGE DRN W4XL4IN RAYON/POLYESTER 6 PLY NONWOVEN PRECUT

## (undated) DEVICE — HIGH FLOW TIP

## (undated) DEVICE — CHLORAPREP 26ML ORANGE

## (undated) DEVICE — PACK PROCEDURE SURG TOT HIP DRP

## (undated) DEVICE — TIBURON SPLIT SHEET: Brand: CONVERTORS

## (undated) DEVICE — GLOVE ORANGE PI 7 1/2   MSG9075

## (undated) DEVICE — TIBURON TOP SHEET: Brand: CONVERTORS